# Patient Record
Sex: FEMALE | Race: WHITE
[De-identification: names, ages, dates, MRNs, and addresses within clinical notes are randomized per-mention and may not be internally consistent; named-entity substitution may affect disease eponyms.]

---

## 2017-01-12 ENCOUNTER — HOSPITAL ENCOUNTER (OUTPATIENT)
Dept: HOSPITAL 47 - RADMRIMAIN | Age: 42
Discharge: HOME | End: 2017-01-12
Payer: COMMERCIAL

## 2017-01-12 DIAGNOSIS — M51.26: Primary | ICD-10-CM

## 2017-01-12 DIAGNOSIS — M51.24: ICD-10-CM

## 2017-01-12 PROCEDURE — 72148 MRI LUMBAR SPINE W/O DYE: CPT

## 2017-01-12 PROCEDURE — 72146 MRI CHEST SPINE W/O DYE: CPT

## 2017-01-12 NOTE — MR
EXAMINATION TYPE: MR lumbar wo con

 

DATE OF EXAM: 1/12/2017 8:52 PM

 

COMPARISON: 6/4/2014

 

HISTORY: Mid/lower back pain, tingling lt arm, rt groin/leg pain

 

T1 and T2  axial and sagittal images of the lumbar spine are submitted.  There is no abnormal signal 
seen within the visualized spinal cord or paraspinal soft tissues.

 

T12-L1: No focal disc herniation or significant disc bulge is evident. No spinal canal stenosis or ne
ural foraminal stenosis is present. 

 

L1-L2: No focal disc herniation or significant disc bulge is evident. No spinal canal stenosis or yessica
ral foraminal stenosis is present. 

 

L2-L3: No focal disc herniation or significant disc bulge is evident. No spinal canal stenosis or yessica
ral foraminal stenosis is present. 

 

L3-L4: No focal disc herniation or significant disc bulge is evident. No spinal canal stenosis or yessica
ral foraminal stenosis is present. 

 

L4-L5: Broad-based central disc bulging and hypertrophic change of the facets with mild effacement of
 thecal sac. Mild bilateral foraminal encroachment.

 

L5-S1: No extruded disc fragments. No spinal canal stenosis. 

 

 

IMPRESSION:

1. L4-L5: Broad-based central disc bulging and hypertrophic change of the facets with mild effacement
 of thecal sac. Mild bilateral foraminal encroachment.

 

 

 

 

 

EXAMINATION TYPE: MR thoracic spine wo con

 

DATE OF EXAM: 1/12/2017

 

COMPARISON: 6/4/2014

 

HISTORY: Mid/lower back pain, tingling lt arm, rt groin/leg pain

 

Standard multiplanar, multisequence MRI departmental protocol

 

Multiplanar, multisequence images of the thoracic were acquired. 

 

FINDINGS: 

 

Alignment is anatomic. There is loss of disc signal space at T4-T5, T6-T7, T7-T8, and T8-T9.

 

At T4-T5 there is a left paracentral disc protrusion broad-based with mild effacement of thecal sac b
ut no spinal cord contact. However, there is mass effect upon the left anterior margin of the spinal 
cord secondary to the thecal sac compression. Neural foramina remain patent.

 

At T6-T7 there is broad-based disc bulge or small protrusion with mild effacement of thecal sac but n
o spinal cord contact. Neural foramina appear to be patent.

 

At T7-T8 there is broad-based central disc protrusion. There is mass effect upon the thecal sac. Ther
e does appear to be mild mass effect upon the right anterior spinal cord secondary to the compression
 of the thecal sac. No cord contact.

 

Remaining levels demonstrate no disc herniation, canal stenosis, or foraminal encroachment.

 

No abnormal signal seen within the visualized thoracic spinal cord. Within the lower cervical spinal 
cord is an area of abnormal signal at the C6-C7 level which is stable and compatible with previous re
ported syrinx. Images are not included on the axial images of the thoracic spine

 

 

 

IMPRESSION: 

1. Disc protrusion at T4-T5, T6-T7, and T7-T8 mass effect upon the thecal sac. There is a degree of m
ass effect upon the spinal cord at T4-T5 and T7-T8 as discussed above but no spinal cord contact. Fin
dings at T4-T5 and T7-T8 is  progressed from previous exam.

2. Abnormal signal within the lower cervical spinal cord which is not included on the axial images of
 the thoracic spine. Previous cervical spine MRI demonstrated evidence of a syrinx in this region whi
ch could be followed with a MRI as clinically warranted

## 2017-11-12 ENCOUNTER — HOSPITAL ENCOUNTER (OUTPATIENT)
Dept: HOSPITAL 47 - EC | Age: 42
Setting detail: OBSERVATION
LOS: 1 days | Discharge: HOME | End: 2017-11-13
Payer: COMMERCIAL

## 2017-11-12 VITALS — RESPIRATION RATE: 18 BRPM

## 2017-11-12 VITALS — BODY MASS INDEX: 28.9 KG/M2

## 2017-11-12 DIAGNOSIS — Z87.891: ICD-10-CM

## 2017-11-12 DIAGNOSIS — Z79.52: ICD-10-CM

## 2017-11-12 DIAGNOSIS — J90: ICD-10-CM

## 2017-11-12 DIAGNOSIS — Z88.5: ICD-10-CM

## 2017-11-12 DIAGNOSIS — R79.89: ICD-10-CM

## 2017-11-12 DIAGNOSIS — E78.5: ICD-10-CM

## 2017-11-12 DIAGNOSIS — R07.9: Primary | ICD-10-CM

## 2017-11-12 DIAGNOSIS — Z82.49: ICD-10-CM

## 2017-11-12 DIAGNOSIS — Z86.19: ICD-10-CM

## 2017-11-12 DIAGNOSIS — G89.29: ICD-10-CM

## 2017-11-12 DIAGNOSIS — K51.90: ICD-10-CM

## 2017-11-12 DIAGNOSIS — Z79.891: ICD-10-CM

## 2017-11-12 DIAGNOSIS — R00.1: ICD-10-CM

## 2017-11-12 DIAGNOSIS — Z79.899: ICD-10-CM

## 2017-11-12 DIAGNOSIS — R74.0: ICD-10-CM

## 2017-11-12 DIAGNOSIS — M54.6: ICD-10-CM

## 2017-11-12 LAB
ALP SERPL-CCNC: 40 U/L (ref 38–126)
ALT SERPL-CCNC: 104 U/L (ref 9–52)
ANION GAP SERPL CALC-SCNC: 6 MMOL/L
APTT BLD: 22.5 SEC (ref 22–30)
AST SERPL-CCNC: 32 U/L (ref 14–36)
BASOPHILS # BLD AUTO: 0 K/UL (ref 0–0.2)
BASOPHILS NFR BLD AUTO: 0 %
BUN SERPL-SCNC: 16 MG/DL (ref 7–17)
CALCIUM SPEC-MCNC: 8.9 MG/DL (ref 8.4–10.2)
CH: 29.5
CHCM: 34.3
CHLORIDE SERPL-SCNC: 112 MMOL/L (ref 98–107)
CK SERPL-CCNC: 22 U/L (ref 30–135)
CK SERPL-CCNC: 25 U/L (ref 30–135)
CO2 SERPL-SCNC: 21 MMOL/L (ref 22–30)
EOSINOPHIL # BLD AUTO: 0.1 K/UL (ref 0–0.7)
EOSINOPHIL NFR BLD AUTO: 1 %
ERYTHROCYTE [DISTWIDTH] IN BLOOD BY AUTOMATED COUNT: 4.27 M/UL (ref 3.8–5.4)
ERYTHROCYTE [DISTWIDTH] IN BLOOD: 13.2 % (ref 11.5–15.5)
GLUCOSE SERPL-MCNC: 90 MG/DL (ref 74–99)
HCT VFR BLD AUTO: 37 % (ref 34–46)
HDW: 2.56
HGB BLD-MCNC: 12.8 GM/DL (ref 11.4–16)
INR PPP: 1.2 (ref ?–1.2)
LUC NFR BLD AUTO: 1 %
LYMPHOCYTES # SPEC AUTO: 4.8 K/UL (ref 1–4.8)
LYMPHOCYTES NFR SPEC AUTO: 44 %
MAGNESIUM SPEC-SCNC: 1.8 MG/DL (ref 1.6–2.3)
MCH RBC QN AUTO: 29.9 PG (ref 25–35)
MCHC RBC AUTO-ENTMCNC: 34.5 G/DL (ref 31–37)
MCV RBC AUTO: 86.6 FL (ref 80–100)
MONOCYTES # BLD AUTO: 0.5 K/UL (ref 0–1)
MONOCYTES NFR BLD AUTO: 5 %
NEUTROPHILS # BLD AUTO: 5.5 K/UL (ref 1.3–7.7)
NEUTROPHILS NFR BLD AUTO: 50 %
NON-AFRICAN AMERICAN GFR(MDRD): >60
PH UR: 7.5 [PH] (ref 5–8)
POTASSIUM SERPL-SCNC: 3.5 MMOL/L (ref 3.5–5.1)
PROT SERPL-MCNC: 5.5 G/DL (ref 6.3–8.2)
PT BLD: 12.4 SEC (ref 9–12)
SODIUM SERPL-SCNC: 139 MMOL/L (ref 137–145)
SP GR UR: 1.01 (ref 1–1.03)
TROPONIN I SERPL-MCNC: <0.012 NG/ML (ref 0–0.03)
TROPONIN I SERPL-MCNC: <0.012 NG/ML (ref 0–0.03)
UA BILLING (MACRO VS. MICRO): (no result)
UROBILINOGEN UR QL STRIP: <2 MG/DL (ref ?–2)
WBC # BLD AUTO: 0.1 10*3/UL
WBC # BLD AUTO: 11 K/UL (ref 3.8–10.6)
WBC (PEROX): 10.99

## 2017-11-12 PROCEDURE — 94760 N-INVAS EAR/PLS OXIMETRY 1: CPT

## 2017-11-12 PROCEDURE — 81003 URINALYSIS AUTO W/O SCOPE: CPT

## 2017-11-12 PROCEDURE — 93350 STRESS TTE ONLY: CPT

## 2017-11-12 PROCEDURE — 85379 FIBRIN DEGRADATION QUANT: CPT

## 2017-11-12 PROCEDURE — 93005 ELECTROCARDIOGRAM TRACING: CPT

## 2017-11-12 PROCEDURE — 80048 BASIC METABOLIC PNL TOTAL CA: CPT

## 2017-11-12 PROCEDURE — 84484 ASSAY OF TROPONIN QUANT: CPT

## 2017-11-12 PROCEDURE — 36415 COLL VENOUS BLD VENIPUNCTURE: CPT

## 2017-11-12 PROCEDURE — 84443 ASSAY THYROID STIM HORMONE: CPT

## 2017-11-12 PROCEDURE — 96361 HYDRATE IV INFUSION ADD-ON: CPT

## 2017-11-12 PROCEDURE — 71020: CPT

## 2017-11-12 PROCEDURE — 83880 ASSAY OF NATRIURETIC PEPTIDE: CPT

## 2017-11-12 PROCEDURE — 85025 COMPLETE CBC W/AUTO DIFF WBC: CPT

## 2017-11-12 PROCEDURE — 82550 ASSAY OF CK (CPK): CPT

## 2017-11-12 PROCEDURE — 82553 CREATINE MB FRACTION: CPT

## 2017-11-12 PROCEDURE — 85610 PROTHROMBIN TIME: CPT

## 2017-11-12 PROCEDURE — 93306 TTE W/DOPPLER COMPLETE: CPT

## 2017-11-12 PROCEDURE — 71275 CT ANGIOGRAPHY CHEST: CPT

## 2017-11-12 PROCEDURE — 85730 THROMBOPLASTIN TIME PARTIAL: CPT

## 2017-11-12 PROCEDURE — 84439 ASSAY OF FREE THYROXINE: CPT

## 2017-11-12 PROCEDURE — 80061 LIPID PANEL: CPT

## 2017-11-12 PROCEDURE — 96375 TX/PRO/DX INJ NEW DRUG ADDON: CPT

## 2017-11-12 PROCEDURE — 99285 EMERGENCY DEPT VISIT HI MDM: CPT

## 2017-11-12 PROCEDURE — 93017 CV STRESS TEST TRACING ONLY: CPT

## 2017-11-12 PROCEDURE — 96366 THER/PROPH/DIAG IV INF ADDON: CPT

## 2017-11-12 PROCEDURE — 71010: CPT

## 2017-11-12 PROCEDURE — 83735 ASSAY OF MAGNESIUM: CPT

## 2017-11-12 PROCEDURE — 96365 THER/PROPH/DIAG IV INF INIT: CPT

## 2017-11-12 PROCEDURE — 80053 COMPREHEN METABOLIC PANEL: CPT

## 2017-11-12 PROCEDURE — 96376 TX/PRO/DX INJ SAME DRUG ADON: CPT

## 2017-11-12 RX ADMIN — NITROGLYCERIN SCH: 20 OINTMENT TOPICAL at 18:34

## 2017-11-12 RX ADMIN — NITROGLYCERIN PRN MG: 0.4 TABLET SUBLINGUAL at 14:42

## 2017-11-12 RX ADMIN — MORPHINE SULFATE SCH MG: 30 TABLET, FILM COATED, EXTENDED RELEASE ORAL at 18:32

## 2017-11-12 RX ADMIN — OXYCODONE HYDROCHLORIDE AND ACETAMINOPHEN SCH EACH: 10; 325 TABLET ORAL at 23:06

## 2017-11-12 RX ADMIN — NITROGLYCERIN PRN MG: 0.4 TABLET SUBLINGUAL at 14:48

## 2017-11-12 NOTE — XR
EXAMINATION TYPE: XR chest 2V

 

DATE OF EXAM ORDERED: 11/12/2017

 

HISTORY: difficulty breathing.

 

REFERENCE: Previous study dated 3/30/2011.

 

FINDINGS: 

The lungs are clear. Pleural spaces are clear. Heart size is normal.

 

IMPRESSION:

 

NORMAL CHEST.

## 2017-11-12 NOTE — CT
EXAMINATION TYPE: CT angio chest

 

DATE OF EXAM: 11/12/2017 1:50 PM

 

COMPARISON: There are small, bilateral pleural effusions, greater on the right than the left. There i
s minimal dependent atelectasis within the dependent portions of the lungs.

 

There is no significant axillary, internal mammary, mediastinal or hilar adenopathy.

 

There is no evidence of pulmonary embolus.

 

The aorta is normal in caliber without evidence of dissection.

 

There is no pericardial fluid. The heart is mildly enlarged.

 

Visualized portions of the upper abdomen are normal.

 

There is minimal hypertrophic spondylosis within the spine.

 

HISTORY: 

1. This examination is negative for pulmonary embolus.

2. Small, bilateral pleural effusions, greater on the right than the left.

3. Mild cardiomegaly.

4. Mild degenerative change within the spine.

 

CT DLP: mGycm

Automated exposure control for dose reduction was used.

 

CONTRAST: 

CTA scan of the thorax is performed , patient injected with mL of , pulmonary embolism protocol. .  

 

FINDINGS:

 

LUNGS: The lungs are grossly clear, there is no concerning parenchymal mass or nodule identified.   T
here is no pleural effusion or pneumothorax seen.  The tracheobronchial tree is patent.

 

MEDIASTINUM: There is satisfactory enhancement of the pulmonary artery and its branches, there is no 
CT evidence for pulmonary embolism.  There are no greater than 1 cm hilar or mediastinal lymph nodes.
   No pericardial effusion is seen. 

 

 

OTHER:  No additional significant abnormality is seen.

 

 

 

IMPRESSION:

## 2017-11-12 NOTE — HP
HISTORY AND PHYSICAL



I am covering for Dr. Stallworth.



DATE OF ADMISSION:

11/12/17



CHIEF COMPLAINT:

Chest pain.



HISTORY:

This 41-year-old woman with a past medical history of multiple medical problems

including chronic back pain, history of C diff, history of breast surgery,

tonsillectomy, being followed by Dr. Stallworth in the outpatient setting 
complaining of

chest pain.  The patient had a heavy feeling of the chest, pressure type of 
pain with

like somebody sitting on the chest on the anterior part of the chest  and the 
pain

radiated to the left shoulder. Initially it was sharp in character, but 
subsequently

patient had shortness of breath and because of the continued symptoms, patient 
came to

[facility] and was admitted for further evaluation and treatment.  The 
initial

troponin was found to be negative and the EKG done showed sinus bradycardia 
with some

ST-T changes. A D-dimer was found to be 1.41.  The patient also underwent a CT

angiography which showed no pulmonary embolism and small bilateral pleural 
effusions

also noted.  Mild DJD of the spine is also noted.  There is no history of fever,

rigors.  No history of headache, loss of consciousness or seizures.



PAST MEDICAL HISTORY:

History of chronic back pain, history of breast surgery.



MEDICATIONS:

Prior to admission include:

1. Hydrocodone 10 mg b.i.d.

2. Zonegran 200 mg q.h.s.

3. Zocor 20 mg q.h.s.

4. MiraLAX 17 g daily p.r.n.

5. MS Contin 30 mg p.o. b.i.d.

6. Nexium 40 mg p.o. daily.



ALLERGIES:

IODINATED CONTRAST DYE.



FAMILY HISTORY:

History of diabetes and heart disease in the family.



SOCIAL HISTORY:

Previous history of smoking.  No history of alcohol intake.



REVIEW OF SYSTEMS:

ENT: No diminished vision, no diminished hearing.  Cardiovascular as mentioned 
earlier.

Respirations as mentioned earlier.  GI no nausea or vomiting.   no dysuria or

retention.  Nervous system: No numbness or weakness.  Allergy/Immunology: No 
asthma or

hayfever.  Musculoskeletal;  as mentioned earlier.  Hematology/Oncology:  No 
history of

anemia.  Endocrine no history of diabetes or hypothyroidism.  Constitutional: As

mentioned earlier.  Dermatology: Negative.  Rheumatology: Negative.  Psychiatric
: As

mentioned earlier.



PHYSICAL EXAM:

Patient is alert, oriented x3.  The pulse is 42, blood pressure 109/61, 
respiratory

rate 14, temperature 98 degrees, pulse ox 98% room air. HEENT: Conjunctivae 
normal.

Oral mucosa moist.  Neck is no jugular venous distention.  No carotid bruit. No 
lymph

node enlargement.

Cardiovascular system:  S1, S2 muffled. No S3, no S4.  Respiratory: Breath 
sounds

diminished in the bases.  No rhonchi.  No crackles.

ABDOMEN:  Soft, nontender.  No mass palpable.  Legs no edema and no swelling.

NERVOUS SYSTEM: Higher functions as mentioned. Moves all 4 limbs.  No focal 
motor or

sensory deficits. Lymphatics: No lymph nodes palpable in the neck, axillae or 
groin.

Skin no ulcer, rash or bleeding.



LABS:

WBC 11, hemoglobin 12.8, INR is 1.2, and CO2 is 21 and ALT is 104 and AST is 32 



ASSESSMENT:

1. Chest pain possible unstable angina.

2. Sinus bradycardia.

3. Increased WBC.

4. Increased ALT.

5. History of back pain.

6. History of Clostridium difficile colitis.

7. Small bilateral pleural effusion on the CTA.



RECOMMENDATIONS AND DISCUSSION:

In this 41-year-old woman who presented with multiple complex medical issues, 
we will

monitor the patient closely, continue the current management and symptomatic 
treatment.

Otherwise at this time, I recommend continue the current medications. Rule out

myocardial infarction.  Unstable angina protocol.  Cardiology consultation.  
Dr. Stallworth

will follow.  Further followup regarding the above-mentioned medical issues as

outpatient.  Discussed with the patient who understands and agrees.





MMODL / IJN: [id number] / Job#: [id number]

MTDD

## 2017-11-12 NOTE — ED
SOB HPI





- General


Chief Complaint: Shortness of Breath


Stated Complaint: malcolm following IV infusion x 3 days


Time Seen by Provider: 11/12/17 11:23


Source: patient, family, RN notes reviewed


Mode of arrival: ambulatory


Limitations: no limitations





- History of Present Illness


Initial Comments: 





This is a 41-year-old female presents with complaints of chest heaviness and 

shortness of breath.  She states she's received 3 IV injections of Solu-Medrol 

her neurologist is using apparently try to rule out MS.  She states she started 

developing left-sided chest pain sharp and heaviness with some radiation to the 

left shoulder it feels like it is sharp initially gets worse with deep 

breathing attempts.  It feels like someone sitting on her chest she states.  

She has a cough fevers chills nausea vomiting sweats or other symptoms.  

Patient has no prior history of heart or lung disease she is a ex-smoker.  

Patient did state that the pain as well as 6/10 at this time.


MD Complaint: shortness of breath, chest pain





- Related Data


 Home Medications











 Medication  Instructions  Recorded  Confirmed


 


Esomeprazole Magnesium [NexIUM] 40 mg PO DAILY 11/12/17 11/12/17


 


Morphine Sulfate ER [Ms Contin 30 mg PO Q12HR 11/12/17 11/12/17





30Mg]   


 


Polyethylene Glycol 3350 [Miralax] 17 gm PO DAILY PRN 11/12/17 11/12/17


 


Simvastatin [Zocor] 20 mg PO HS 11/12/17 11/12/17


 


Zonisamide [Zonegran] 200 mg PO HS 11/12/17 11/12/17


 


oxyCODONE-APAP 10-325MG [Percocet 1 tab PO BID 11/12/17 11/12/17





 mg]   











 Allergies











Allergy/AdvReac Type Severity Reaction Status Date / Time


 


Iodinated Contrast- Oral and Allergy  Itching Verified 11/12/17 11:47





IV Dye     














Review of Systems


ROS Statement: 


Those systems with pertinent positive or pertinent negative responses have been 

documented in the HPI.





ROS Other: All systems not noted in ROS Statement are negative.





Past Medical History


Past Medical History: No Reported History


History of Any Multi-Drug Resistant Organisms: C-DIFF


Date of last positivie culture/infection: 2015


Past Surgical History: Breast Surgery, Tonsillectomy


Past Psychological History: No Psychological Hx Reported


Smoking Status: Former smoker


Past Alcohol Use History: None Reported


Past Drug Use History: None Reported





General Exam





- General Exam Comments


Initial Comments: 





This is a well-developed well-nourished awake alert oriented 3 female


Limitations: no limitations


General appearance: alert, anxious


Head exam: Present: atraumatic, normocephalic, normal inspection


Eye exam: Present: normal appearance, PERRL, EOMI.  Absent: scleral icterus, 

conjunctival injection, periorbital swelling


ENT exam: Present: normal exam, mucous membranes moist


Neck exam: Present: normal inspection.  Absent: tenderness, meningismus, 

lymphadenopathy


Respiratory exam: Present: normal lung sounds bilaterally.  Absent: respiratory 

distress, wheezes, rales, rhonchi, stridor


Cardiovascular Exam: Present: regular rate, normal rhythm, normal heart sounds.

  Absent: systolic murmur, diastolic murmur, rubs, gallop, clicks


GI/Abdominal exam: Present: soft, normal bowel sounds.  Absent: distended, 

tenderness, guarding, rebound, rigid


Extremities exam: Present: normal inspection, full ROM, normal capillary 

refill.  Absent: tenderness, pedal edema, joint swelling, calf tenderness


Back exam: Present: normal inspection


Neurological exam: Present: alert, oriented X3, CN II-XII intact


Psychiatric exam: Present: normal affect, normal mood


Skin exam: Present: warm, dry, intact, normal color.  Absent: rash





Course


 Vital Signs











  11/12/17 11/12/17 11/12/17





  11:15 12:25 12:26


 


Temperature 97.7 F  


 


Pulse Rate 50 L 42 L 


 


Respiratory 18 16 16





Rate   


 


Blood Pressure 124/57  


 


O2 Sat by Pulse 100 100 





Oximetry   














  11/12/17 11/12/17 11/12/17





  12:52 13:55 14:43


 


Temperature   


 


Pulse Rate 51 L 40 L 50 L


 


Respiratory 16 18 





Rate   


 


Blood Pressure 133/69 147/71 146/67


 


O2 Sat by Pulse 100 100 





Oximetry   














  11/12/17 11/12/17





  14:48 15:08


 


Temperature  


 


Pulse Rate 55 L 50 L


 


Respiratory 18 18





Rate  


 


Blood Pressure 119/62 105/58


 


O2 Sat by Pulse 99 97





Oximetry  














Medical Decision Making





- Medical Decision Making





Patient persisted having retrosternal chest pressure.  Nitroglycerin did seem 

to help.  Patient has symptoms consistent with angina.  She will be admitted 

for cardiology consultation.  The case will be discussed with Dr. Martínez who is 

covering Dr. Stallworth.





- Lab Data


Result diagrams: 


 11/12/17 12:00





 11/12/17 12:00


 Lab Results











  11/12/17 11/12/17 11/12/17 Range/Units





  12:00 12:00 12:00 


 


WBC   11.0 H   (3.8-10.6)  k/uL


 


RBC   4.27   (3.80-5.40)  m/uL


 


Hgb   12.8   (11.4-16.0)  gm/dL


 


Hct   37.0   (34.0-46.0)  %


 


MCV   86.6   (80.0-100.0)  fL


 


MCH   29.9   (25.0-35.0)  pg


 


MCHC   34.5   (31.0-37.0)  g/dL


 


RDW   13.2   (11.5-15.5)  %


 


Plt Count   204   (150-450)  k/uL


 


Neutrophils %   50   %


 


Lymphocytes %   44   %


 


Monocytes %   5   %


 


Eosinophils %   1   %


 


Basophils %   0   %


 


Neutrophils #   5.5   (1.3-7.7)  k/uL


 


Lymphocytes #   4.8   (1.0-4.8)  k/uL


 


Monocytes #   0.5   (0-1.0)  k/uL


 


Eosinophils #   0.1   (0-0.7)  k/uL


 


Basophils #   0.0   (0-0.2)  k/uL


 


PT     (9.0-12.0)  sec


 


INR     (<1.2)  


 


APTT     (22.0-30.0)  sec


 


D-Dimer     (<0.60)  mg/L FEU


 


Sodium    139  (137-145)  mmol/L


 


Potassium    3.5  (3.5-5.1)  mmol/L


 


Chloride    112 H  ()  mmol/L


 


Carbon Dioxide    21 L  (22-30)  mmol/L


 


Anion Gap    6  mmol/L


 


BUN    16  (7-17)  mg/dL


 


Creatinine    0.69  (0.52-1.04)  mg/dL


 


Est GFR (MDRD) Af Amer    >60  (>60 ml/min/1.73 sqM)  


 


Est GFR (MDRD) Non-Af    >60  (>60 ml/min/1.73 sqM)  


 


Glucose    90  (74-99)  mg/dL


 


Calcium    8.9  (8.4-10.2)  mg/dL


 


Magnesium    1.8  (1.6-2.3)  mg/dL


 


Total Bilirubin    0.3  (0.2-1.3)  mg/dL


 


AST    32  (14-36)  U/L


 


ALT    104 H  (9-52)  U/L


 


Alkaline Phosphatase    40  ()  U/L


 


Total Creatine Kinase  25 L    ()  U/L


 


CK-MB (CK-2)  0.3    (0.0-2.4)  ng/mL


 


CK-MB (CK-2) Rel Index  1.2    


 


Troponin I  <0.012    (0.000-0.034)  ng/mL


 


NT-Pro-B Natriuret Pep     pg/mL


 


Total Protein    5.5 L  (6.3-8.2)  g/dL


 


Albumin    3.0 L  (3.5-5.0)  g/dL














  11/12/17 11/12/17 Range/Units





  12:00 12:00 


 


WBC    (3.8-10.6)  k/uL


 


RBC    (3.80-5.40)  m/uL


 


Hgb    (11.4-16.0)  gm/dL


 


Hct    (34.0-46.0)  %


 


MCV    (80.0-100.0)  fL


 


MCH    (25.0-35.0)  pg


 


MCHC    (31.0-37.0)  g/dL


 


RDW    (11.5-15.5)  %


 


Plt Count    (150-450)  k/uL


 


Neutrophils %    %


 


Lymphocytes %    %


 


Monocytes %    %


 


Eosinophils %    %


 


Basophils %    %


 


Neutrophils #    (1.3-7.7)  k/uL


 


Lymphocytes #    (1.0-4.8)  k/uL


 


Monocytes #    (0-1.0)  k/uL


 


Eosinophils #    (0-0.7)  k/uL


 


Basophils #    (0-0.2)  k/uL


 


PT  12.4 H   (9.0-12.0)  sec


 


INR  1.2 H   (<1.2)  


 


APTT  22.5   (22.0-30.0)  sec


 


D-Dimer  1.41 H   (<0.60)  mg/L FEU


 


Sodium    (137-145)  mmol/L


 


Potassium    (3.5-5.1)  mmol/L


 


Chloride    ()  mmol/L


 


Carbon Dioxide    (22-30)  mmol/L


 


Anion Gap    mmol/L


 


BUN    (7-17)  mg/dL


 


Creatinine    (0.52-1.04)  mg/dL


 


Est GFR (MDRD) Af Amer    (>60 ml/min/1.73 sqM)  


 


Est GFR (MDRD) Non-Af    (>60 ml/min/1.73 sqM)  


 


Glucose    (74-99)  mg/dL


 


Calcium    (8.4-10.2)  mg/dL


 


Magnesium    (1.6-2.3)  mg/dL


 


Total Bilirubin    (0.2-1.3)  mg/dL


 


AST    (14-36)  U/L


 


ALT    (9-52)  U/L


 


Alkaline Phosphatase    ()  U/L


 


Total Creatine Kinase    ()  U/L


 


CK-MB (CK-2)    (0.0-2.4)  ng/mL


 


CK-MB (CK-2) Rel Index    


 


Troponin I    (0.000-0.034)  ng/mL


 


NT-Pro-B Natriuret Pep   546  pg/mL


 


Total Protein    (6.3-8.2)  g/dL


 


Albumin    (3.5-5.0)  g/dL














- EKG Data


-: EKG Interpreted by Me


EKG shows normal: sinus rhythm (Sinus bradycardia rate of 40.  Ago 122 QRS 90 

QT since QTC of 456/371 no acute ST-T wave changes seen)





- Radiology Data


Radiology results: report reviewed (I did review the imaging and reports no 

acute findings no evidence of PE.), image reviewed





Disposition


Clinical Impression: 


 Unstable angina, Chest pain





Disposition: ADMITTED AS IP TO THIS [facility]


Condition: Stable


Referrals: 


Lester Stallworth DO [Primary Care Provider] - 1-2 days

## 2017-11-13 VITALS — SYSTOLIC BLOOD PRESSURE: 119 MMHG | TEMPERATURE: 98.2 F | DIASTOLIC BLOOD PRESSURE: 57 MMHG | HEART RATE: 58 BPM

## 2017-11-13 LAB
ANION GAP SERPL CALC-SCNC: 5 MMOL/L
BASOPHILS # BLD AUTO: 0 K/UL (ref 0–0.2)
BASOPHILS NFR BLD AUTO: 0 %
BUN SERPL-SCNC: 13 MG/DL (ref 7–17)
CALCIUM SPEC-MCNC: 8.7 MG/DL (ref 8.4–10.2)
CH: 29.3
CHCM: 33.6
CHLORIDE SERPL-SCNC: 110 MMOL/L (ref 98–107)
CHOLEST SERPL-MCNC: 136 MG/DL (ref ?–200)
CK SERPL-CCNC: 22 U/L (ref 30–135)
CO2 SERPL-SCNC: 24 MMOL/L (ref 22–30)
EOSINOPHIL # BLD AUTO: 0 K/UL (ref 0–0.7)
EOSINOPHIL NFR BLD AUTO: 0 %
ERYTHROCYTE [DISTWIDTH] IN BLOOD BY AUTOMATED COUNT: 4.04 M/UL (ref 3.8–5.4)
ERYTHROCYTE [DISTWIDTH] IN BLOOD: 13.2 % (ref 11.5–15.5)
GLUCOSE SERPL-MCNC: 100 MG/DL (ref 74–99)
HCT VFR BLD AUTO: 35.4 % (ref 34–46)
HDLC SERPL-MCNC: 55 MG/DL (ref 40–60)
HDW: 2.54
HGB BLD-MCNC: 11.7 GM/DL (ref 11.4–16)
LUC NFR BLD AUTO: 1 %
LYMPHOCYTES # SPEC AUTO: 2.9 K/UL (ref 1–4.8)
LYMPHOCYTES NFR SPEC AUTO: 22 %
MCH RBC QN AUTO: 29 PG (ref 25–35)
MCHC RBC AUTO-ENTMCNC: 33.1 G/DL (ref 31–37)
MCV RBC AUTO: 87.6 FL (ref 80–100)
MONOCYTES # BLD AUTO: 0.9 K/UL (ref 0–1)
MONOCYTES NFR BLD AUTO: 7 %
NEUTROPHILS # BLD AUTO: 9.3 K/UL (ref 1.3–7.7)
NEUTROPHILS NFR BLD AUTO: 70 %
NON-AFRICAN AMERICAN GFR(MDRD): >60
POTASSIUM SERPL-SCNC: 3.7 MMOL/L (ref 3.5–5.1)
SODIUM SERPL-SCNC: 139 MMOL/L (ref 137–145)
TROPONIN I SERPL-MCNC: <0.012 NG/ML (ref 0–0.03)
WBC # BLD AUTO: 0.11 10*3/UL
WBC # BLD AUTO: 13.2 K/UL (ref 3.8–10.6)
WBC (PEROX): 13.57

## 2017-11-13 RX ADMIN — OXYCODONE HYDROCHLORIDE AND ACETAMINOPHEN SCH EACH: 10; 325 TABLET ORAL at 11:24

## 2017-11-13 RX ADMIN — NITROGLYCERIN SCH: 20 OINTMENT TOPICAL at 04:06

## 2017-11-13 RX ADMIN — MORPHINE SULFATE SCH MG: 30 TABLET, FILM COATED, EXTENDED RELEASE ORAL at 05:03

## 2017-11-13 RX ADMIN — NITROGLYCERIN SCH INCH: 20 OINTMENT TOPICAL at 05:18

## 2017-11-13 NOTE — P.CRDCN
History of Present Illness


Consult date: 17


History of present illness: 





This is a 41-year-old  female patient past medical history significant 

for high cholesterol.  She also suffers from chronic thoracic back pain and is 

currently being evaluated by a neurologist to rule out MS. She recently 

underwent a three-day infusion of steroids Wednesday, Thursday and Friday of 

last week.  After the third day she developed heavy pressure type chest pain on 

the left side of the chest that radiated into the left shoulder, arm and flank/

thoracic area.  She says this is associated with dizziness and shortness of 

breath.  The shortness of breath is worse when she lays flat and she has been 

requiring sitting up on multiple pillows. The pain is worse when she lays flat 

as well. If she sits up the pain almost entirely goes away except for in the 

flank region. At the time of my exam she is seen sitting up in bed in no acute 

distress. She denies symptoms of chest pain, shortness of breath, dizziness, 

palpitations, nausea or vomiting. 


EKG reveals sinus bradycardia with nonspecific flattened T-wave abnormalities 

in inferior leads with a Q-wave. There is no old for comparison. 


Cardiac enzymes negative x3.  D-dimer 1.41 with negative CTA for PE. 


Current cardiac medications include simvastatin 20 mg daily. 


 





Review of Systems





CONSTITUTIONAL: Denies fever. Denies chills.


EYES: Denies blurred vision. Denies vision changes. Denies eye pain.


EARS, NOSE, MOUTH & THROAT: Denies headache. Denies sore throat. Denies ear 

pain.


CARDIOVASCULAR: Complains of one episode of chest pain with shortness of breath 

and dizziness, resolved. Denies orthopnea. Denies PND. Denies palpitations.


RESPIRATORY: Denies cough. 


GASTROINTESTINAL: Denies abdominal pain. Denies diarrhea. Denies constipation. 

Denies nausea. Denies vomiting.


MUSCULOSKELETAL: Denies myalgias.


INTEGUMENTARY: Denies pruitis. Denies rash.


NEUROLOGIC: Denies numbness. Denies tingling. Denies weakness.


PSYCHIATRIC: Denies anxiety. Denies depression.


ENDOCRINE: Denies fatigue. Denies weight change. Denies polydipsia. Denies 

polyurina.


GENITOURINARY: Denies burning, hematuria or urgency with micturation.


HEMATOLOGIC: Denies history of anemia. Denies bleeding. 








Past Medical History


Past Medical History: No Reported History


Additional Past Medical History / Comment(s): chronic back pain- pt recieving 

infusions and rule out MS.


History of Any Multi-Drug Resistant Organisms: C-DIFF


Date of last positivie culture/infection: 


MDRO Source:: stool


Past Surgical History: Breast Surgery, Tonsillectomy


Additional Past Surgical History / Comment(s): lumpectomy


Past Anesthesia/Blood Transfusion Reactions: No Reported Reaction


Past Psychological History: No Psychological Hx Reported


Smoking Status: Former smoker


Past Alcohol Use History: None Reported


Additional Past Alcohol Use History / Comment(s): stopped smoking in . pt 

states she was smoking half a pack to a pack a day.


Past Drug Use History: None Reported





- Past Family History


  ** Father


Family Medical History: Diabetes Mellitus


Additional Family Medical History / Comment(s): heart disease.  at 22 from 

car accident.





  ** Mother


Family Medical History: No Reported History





Medications and Allergies


 Home Medications











 Medication  Instructions  Recorded  Confirmed  Type


 


Esomeprazole Magnesium [NexIUM] 40 mg PO DAILY 17 History


 


Morphine Sulfate ER [Ms Contin 30 mg PO Q12HR 17 History





30Mg]    


 


Polyethylene Glycol 3350 [Miralax] 17 gm PO DAILY PRN 17 History


 


Simvastatin [Zocor] 20 mg PO HS 17 History


 


Zonisamide [Zonegran] 200 mg PO HS 17 History


 


oxyCODONE-APAP 10-325MG [Percocet 1 tab PO BID 17 History





 mg]    











 Allergies











Allergy/AdvReac Type Severity Reaction Status Date / Time


 


Iodinated Contrast- Oral and Allergy  Itching Verified 17 11:47





IV Dye     














Physical Exam


Vitals: 


 Vital Signs











  Temp Pulse Pulse Resp BP BP BP


 


 17 07:45  97.5 F L   56 L  18    140/68


 


 17 04:00  97.9 F   40 L  18   100/56 


 


 17 00:00    40 L  18   


 


 17 23:42  97.9 F   49 L  18   83/47 


 


 17 20:00    52 L  18   


 


 17 19:55  97.9 F   52 L  18   105/55 


 


 17 17:05    42 L  14   


 


 17 16:35  98.0 F   42 L  14   109/61 


 


 17 16:11  98 F  52 L   20  121/58  


 


 17 15:08   50 L   18  105/58  


 


 17 14:48   55 L   18  119/62  


 


 17 14:43   50 L    146/67  


 


 17 13:55   40 L   18  147/71  


 


 17 12:52   51 L   16  133/69  


 


 17 12:26     16   


 


 17 12:25   42 L   16   


 


 17 11:15  97.7 F  50 L   18  124/57  














  Pulse Ox


 


 17 07:45  100


 


 17 04:00  98


 


 17 00:00 


 


 17 23:42  98


 


 17 20:00 


 


 17 19:55  97


 


 17 17:05 


 


 17 16:35  99


 


 17 16:11  97


 


 17 15:08  97


 


 17 14:48  99


 


 17 14:43 


 


 17 13:55  100


 


 17 12:52  100


 


 17 12:26 


 


 17 12:25  100


 


 17 11:15  100








 Intake and Output











 17





 22:59 06:59 14:59


 


Intake Total  1080 


 


Balance  1080 


 


Intake:   


 


  Intake, IV Titration  480 





  Amount   


 


    Heparin Sodium,Porcine/  240 





    D5w Pmx 25,000 unit In   





    Dextrose/Water 1 500ml.   





    bag @ 12 UNITS/KG/HR 17.   





    96 mls/hr IV .Q24H JUDY Rx   





    #:[id number]   


 


    Sodium Chloride 0.9% 1,  240 





    000 ml @ 20 mls/hr IV .   





    Q24H JUDY Rx#:[id number]   


 


  Oral  600 


 


Other:   


 


  Voiding Method Toilet Toilet 


 


  # Voids  2 


 


  Weight 76.4 kg  














GENERAL: This is a 41-year-old  female in no apparent distress at the 

time of my examination.


HEENT: Head is atraumatic, normocephalic. Pupils are equal, round. Sclerae 

anicteric. Conjunctivae are clear. Mucous membranes of the mouth are moist. 

Neck is supple. There is no jugular venous distention. No carotid bruit is 

heard.


LUNGS: Clear to auscultation no wheezes, rales or rhonchi. No chest wall 

tenderness is noted on palpation or with deep breathing.


HEART: Regular rate and rhythm without murmurs, rubs or gallops. S1 and S2 

heard.


ABDOMEN: Soft, nontender. Bowel sounds are heard. No organomegaly noted.


EXTREMITIES: 2+ peripheral pulses with no evidence of peripheral edema and no 

calf tenderness noted.


NEUROLOGIC: Patient is awake, alert and oriented x3.


 








Results





 17 05:28





 17 05:28


 Cardiac Enzymes











  17 Range/Units





  12:00 12:00 18:24 


 


AST   32   (14-36)  U/L


 


CK-MB (CK-2)  0.3   0.2  (0.0-2.4)  ng/mL


 


Troponin I  <0.012   <0.012  (0.000-0.034)  ng/mL














  17 Range/Units





  00:44 


 


AST   (14-36)  U/L


 


CK-MB (CK-2)  <0.2  (0.0-2.4)  ng/mL


 


Troponin I  <0.012  (0.000-0.034)  ng/mL








 Coagulation











  17 Range/Units





  12:00 00:44 


 


PT  12.4 H   (9.0-12.0)  sec


 


APTT  22.5  51.7 H  (22.0-30.0)  sec








 Lipids











  17 Range/Units





  05:28 


 


Triglycerides  66  (<150)  mg/dL


 


Cholesterol  136  (<200)  mg/dL


 


HDL Cholesterol  55  (40-60)  mg/dL








 CBC











  17 Range/Units





  12:00 05:28 


 


WBC  11.0 H  13.2 H  (3.8-10.6)  k/uL


 


RBC  4.27  4.04  (3.80-5.40)  m/uL


 


Hgb  12.8  11.7  (11.4-16.0)  gm/dL


 


Hct  37.0  35.4  (34.0-46.0)  %


 


Plt Count  204  219  (150-450)  k/uL








 Comprehensive Metabolic Panel











  17 Range/Units





  12:00 05:28 


 


Sodium  139  139  (137-145)  mmol/L


 


Potassium  3.5  3.7  (3.5-5.1)  mmol/L


 


Chloride  112 H  110 H  ()  mmol/L


 


Carbon Dioxide  21 L  24  (22-30)  mmol/L


 


BUN  16  13  (7-17)  mg/dL


 


Creatinine  0.69  0.60  (0.52-1.04)  mg/dL


 


Glucose  90  100 H  (74-99)  mg/dL


 


Calcium  8.9  8.7  (8.4-10.2)  mg/dL


 


AST  32   (14-36)  U/L


 


ALT  104 H   (9-52)  U/L


 


Alkaline Phosphatase  40   ()  U/L


 


Total Protein  5.5 L   (6.3-8.2)  g/dL


 


Albumin  3.0 L   (3.5-5.0)  g/dL








 Current Medications











Generic Name Dose Route Start Last Admin





  Trade Name Freq  PRN Reason Stop Dose Admin


 


Aspirin  325 mg  17 09:00  





  Aspirin  PO   





  DAILY [facility]   


 


Atorvastatin Calcium  10 mg  17 21:00  17 20:51





  Lipitor  PO   10 mg





  [facility] JUDY   Administration


 


Sodium Chloride  1,000 mls @ 20 mls/hr  17 16:00  17 16:06





  Saline 0.9%  IV   20 mls/hr





  .Q24H JUDY   Administration


 


Heparin Sodium/Dextrose 25,000  500 mls @ 17.96 mls/hr  17 16:00   16:07





  unit/ IV Solution  IV   12 units/kg/hr





  .Q24H JUDY   17.96 mls/hr





  Protocol   Administration





  12 UNITS/KG/HR   


 


Miscellaneous Information  1 each  17 13:09  





  Rx Info: Iv Contrast Was Given  MISCELLANE  17 13:09  





  DAILY PRN   





  Per Protocol   


 


Morphine Sulfate  30 mg  17 19:00  17 05:03





  Ms Contin  PO   30 mg





  0500,1700 JUDY   Administration


 


Nitroglycerin  0.4 mg  17 14:33  17 14:48





  Nitrostat  SUBLINGUAL   0.4 mg





  Q5M PRN   Administration





  Chest Pain   


 


Nitroglycerin  1 inch  17 18:00  17 05:18





  Nitro-Bid Oint  TOPICAL   1 inch





  Q6HR JUDY   Administration


 


Oxycodone/Acetaminophen  1 each  17 19:00  17 23:06





  Percocet   PO   1 each





  0500,1700 JUDY   Administration


 


Pantoprazole Sodium  40 mg  17 07:30  





  Protonix  PO   





  AC-BRKFST JUDY   


 


Polyethylene Glycol  17 gm  17 15:54  





  Miralax  PO   





  DAILY PRN   





  Constipation   


 


Zonisamide  200 mg  17 21:00  17 20:51





  Zonegran  PO   200 mg





  HS JUDY   Administration








 Intake and Output











 17





 22:59 06:59 14:59


 


Intake Total  1080 


 


Balance  1080 


 


Intake:   


 


  Intake, IV Titration  480 





  Amount   


 


    Heparin Sodium,Porcine/  240 





    D5w Pmx 25,000 unit In   





    Dextrose/Water 1 500ml.   





    bag @ 12 UNITS/KG/HR 17.   





    96 mls/hr IV .Q24H JUDY Rx   





    #:[id number]   


 


    Sodium Chloride 0.9% 1,  240 





    000 ml @ 20 mls/hr IV .   





    Q24H JUDY Rx#:[id number]   


 


  Oral  600 


 


Other:   


 


  Voiding Method Toilet Toilet 


 


  # Voids  2 


 


  Weight 76.4 kg  








 





 17 05:28 





 17 05:28 











Assessment and Plan


Assessment: 





ASSESSMENT


1. Chest pain, atypical for acute coronary syndrome with risk factor of 

hyperlipidemia. 


2. Hyperlipidemia





PLAN


Obtain 2D echocardiogram and doppler study to evaluate cardiac structure and 

function.


Perform stress echocardiogram to evaluate for ischemia. 


If this diagnostic testing is negative, from a cardiac perspective, she is 

stable for discharge home to follow up with PCP. 


Thank you kindly for this consultation. 





Nurse Practitioner note has been reviewed, I agree with a documented findings 

and plan of care.  Patient was seen and examined.

## 2017-11-13 NOTE — ECHOF
Referral Reason:chest pain



MEASUREMENTS

--------

HEIGHT: 162.6 cm

WEIGHT: 76.2 kg

BP: 100/56

RVIDd:   3.0 cm     (< 3.3)

IVSd:   1.0 cm     (0.6 - 1.1)

LVIDd:   4.1 cm     (3.9 - 5.3)

LVPWd:   0.9 cm     (0.6 - 1.1)

IVSs:   1.2 cm

LVIDs:   2.9 cm

LVPWs:   1.4 cm

LA Diam:   3.2 cm     (2.7 - 3.8)

LAESV Index (A-L):   24.96 ml/m

Ao Diam:   3.0 cm     (2.0 - 3.7)

AV Cusp:   2.2 cm     (1.5 - 2.6)

MV EXCURSION:   17.484 mm     (> 18.000)

MV EF SLOPE:   212 mm/s     (70 - 150)

EPSS:   0.2 cm

MV E Obed:   1.27 m/s

MV DecT:   177 ms

MV A Obed:   0.74 m/s

MV E/A Ratio:   1.72 

RAP:   5.00 mmHg

RVSP:   23.28 mmHg







FINDINGS

--------

Resting bradycardia (HR<60bpm).

This was a technically good study.

The left ventricular size is normal.   Left ventricular wall thickness is normal.   Overall left vent
ricular systolic function is normal with, an EF between 60 - 65 %.

The right ventricle is normal in size.

Normal LA  size by volume 22+/-6 ml/m2.

The right atrium is normal in size.

The aortic valve is trileaflet and appears structurally normal.

Mild mitral annular calcification present.   There is trace mitral regurgitation.

Mild tricuspid regurgitation present.   Right ventricular systolic pressure is normal at < 35 mmHg.

Trace/mild (physiologic)  pulmonic regurgitation.

The aortic root size is normal.

The inferior vena cava is dilated with poor inspiratory collapse which is consistent with estimated r
ight atrial pressure of 15 mmHg.

There is no pericardial effusion.



CONCLUSIONS

--------

1. Resting bradycardia (HR<60bpm).

2. This was a technically good study.

3. The left ventricular size is normal.

4. Left ventricular wall thickness is normal.

5. Overall left ventricular systolic function is normal with, an EF between 60 - 65 %.

6. The right ventricle is normal in size.

7. Normal LA size by volume 22+/-6 ml/m2.

8. The right atrium is normal in size.

9. The aortic valve is trileaflet and appears structurally normal.

10. Mild mitral annular calcification present.

11. There is trace mitral regurgitation.

12. Mild tricuspid regurgitation present.

13. Right ventricular systolic pressure is normal at < 35 mmHg.

14. Trace/mild (physiologic)  pulmonic regurgitation.

15. The aortic root size is normal.

16. The inferior vena cava is dilated with poor inspiratory collapse which is consistent with estimat
ed right atrial pressure of 15 mmHg.

17. There is no pericardial effusion.





SONOGRAPHER: Caridad Valencia RDCS

## 2017-11-13 NOTE — P.DS
Providers


Date of admission: 


11/12/17 15:52





Expected date of discharge: 11/13/17


Attending physician: 


Lester Stallworth





Consults: 





 





11/12/17 15:52


Consult Physician Urgent 


   Consulting Provider: Gus Nails


   Consult Reason/Comments: Chest pain


   Do you want consulting provider notified?: Yes











Primary care physician: 


Lester [facility] Course: 





41 year old  female who presented to the emergency room on 11/12/2017 

with a chief complaint of chest pain.  The patient's EKG completed in the 

emergency room showed sinus bradycardia with a rate in the 40s.  Her d-dimer 

was elevated at 1.41.  She underwent a CT of the chest which was negative for 

pulmonary embolism but did show small bilateral pleural effusions.





The patient was seen and examined at the bedside on rounds with Dr. Stallworth. She 

states she has been undergoing workup for possible MS and has had high-dose 

steroids for the last 3 days per her neurologist.  She started having chest 

pain that she describes as "squeezing" on the left side of her chest that wraps 

around to the left side of her back.  The pain is not reproducible upon 

palpation.  She states there is nothing that makes it better or worse.  She 

states she is unable to lay down because the pain intensifies when she is 

supine.  She states that she feels like she is unable to take a deep breath.  

She remains on 2 L nasal cannula and is maintaining oxygen saturation greater 

than 92%.  The patient remains bradycardic with a rate ranging from 38-56 

overnight.  Troponins were negative 3.  Lipid panel reveals triglycerides of 66

, cholesterol 136, LDL 68, and HDL 55.  





An echo was completed which revealed EF 60-65%, trace mitral regurgitation, 

mild tricuspid regurgitation. She underwent stress testing today which was 

found to be negative per cardiology NP, Poonam Jacobson.





The patient is stable for DC and may follow up with Dr. Stallworth outpatient. No 

need for cardiology follow up per SHARRON Levin








DISCHARGE DIAGNOSIS





Chest pain, present on admission, troponins negative 3, stress testing negative

, Echo shows EF 60-65%, trace mitral regurgitation, mild tricuspid regurgitation





Elevated d-dimer, CT chest negative for pulmonary emboli but noted to have 

small bilateral pleural effusions





Sinus bradycardia, asymptomatic, improving





Chronic back pain with recent high-dose steroid infusions for pain control and 

possible multiple sclerosis





History of ulcerative colitis





History of Clostridium difficile





History of tobacco abuse, in remission, patient quit smoking cigarettes in 2011








Nurse practitioner note has been reviewed by physician. Signing provider agrees 

with the documented findings, assessment, and plan of care. 





Patient Condition at Discharge: Stable





Plan - Discharge Summary


New Discharge Prescriptions: 


Continue


   oxyCODONE-APAP 10-325MG [Percocet  mg] 1 tab PO BID


   Zonisamide [Zonegran] 200 mg PO HS


   Simvastatin [Zocor] 20 mg PO HS


   Morphine Sulfate ER [Ms Contin] 30 mg PO Q12HR


   Esomeprazole Magnesium [NexIUM] 40 mg PO DAILY


   Polyethylene Glycol 3350 [Miralax] 17 gm PO DAILY PRN


     PRN Reason: Constipation


Discharge Medication List





Esomeprazole Magnesium [NexIUM] 40 mg PO DAILY 11/12/17 [History]


Morphine Sulfate ER [Ms Contin] 30 mg PO Q12HR 11/12/17 [History]


Polyethylene Glycol 3350 [Miralax] 17 gm PO DAILY PRN 11/12/17 [History]


Simvastatin [Zocor] 20 mg PO HS 11/12/17 [History]


Zonisamide [Zonegran] 200 mg PO HS 11/12/17 [History]


oxyCODONE-APAP 10-325MG [Percocet  mg] 1 tab PO BID 11/12/17 [History]








Follow up Appointment(s)/Referral(s): 


Lester Stallworth DO [Primary Care Provider] - 1-2 days


Discharge Disposition: HOME SELF-CARE
(0) swallows foods/liquids without difficulty

## 2017-11-13 NOTE — XR
EXAMINATION TYPE: XR chest 1V portable

 

DATE OF EXAM: 11/13/2017

 

COMPARISON: 11/12/2017

 

HISTORY: Chest pain

 

TECHNIQUE: Single frontal view of the chest is obtained.

 

FINDINGS:  

No focal infiltrate is identified. Blunting of the right costophrenic angle is felt to reflect small 
effusion.

The cardiac silhouette size is within normal limits.   

The osseous structures are intact.

 

IMPRESSION:  

1. Small right-sided effusion suspected.

## 2017-11-13 NOTE — P.PN
Subjective


Progress Note Date: 11/13/17





41 year old  female who presented to the emergency room on 11/12/2017 

with a chief complaint of chest pain.  The patient's EKG completed in the 

emergency room showed sinus bradycardia with a rate in the 40s.  Her d-dimer 

was elevated at 1.41.  She underwent a CT of the chest which was negative for 

pulmonary embolism but did show small bilateral pleural effusions.





The patient was seen and examined at the bedside on rounds with Dr. Stallworth. She 

states she has been undergoing workup for possible MS and has had high-dose 

steroids for the last 3 days per her neurologist.  She started having chest 

pain that she describes as "squeezing" on the left side of her chest that wraps 

around to the left side of her back.  The pain is not reproducible upon 

palpation.  She states there is nothing that makes it better or worse.  She 

states she is unable to lay down because the pain intensifies when she is 

supine.  She states that she feels like she is unable to take a deep breath.  

She remains on 2 L nasal cannula and is maintaining oxygen saturation greater 

than 92%.  The patient remains bradycardic with a rate ranging from 38-56 

overnight.  Troponins were negative 3.  Lipid panel reveals triglycerides of 66

, cholesterol 136, LDL 68, and HDL 55.  An echo was completed and results are 

currently pending.  Consultations have been placed to cardiology for further 

workup.











Objective





- Vital Signs


Vital signs: 


 Vital Signs











Temp  97.5 F L  11/13/17 07:45


 


Pulse  56 L  11/13/17 07:45


 


Resp  18   11/13/17 07:45


 


BP  140/68   11/13/17 07:45


 


Pulse Ox  100   11/13/17 09:25








 Intake & Output











 11/12/17 11/13/17 11/13/17





 18:59 06:59 18:59


 


Intake Total  1080 


 


Balance  1080 


 


Weight 76.4 kg  


 


Intake:   


 


  Intake, IV Titration  480 





  Amount   


 


    Heparin Sodium,Porcine/  240 





    D5w Pmx 25,000 unit In   





    Dextrose/Water 1 500ml.   





    bag @ 12 UNITS/KG/HR 17.   





    96 mls/hr IV .Q24H JUDY Rx   





    #:[id number]   


 


    Sodium Chloride 0.9% 1,  240 





    000 ml @ 20 mls/hr IV .   





    Q24H JUDY Rx#:[id number]   


 


  Oral  600 


 


Other:   


 


  Voiding Method Toilet Toilet 


 


  # Voids  2 














- Exam





GENERAL: This is a  41-year-old  female in no apparent distress at the 

time of examination. Pleasant and cooperative.


HEENT: Head is atraumatic, normocephalic. Pupils are equal, round, and reactive 

to light. Sclerae anicteric. Conjunctivae are clear. Mucus membranes of the 

mouth are moist. Neck is supple. 


RESPIRATORY: Clear to ausculation. No wheezes, rales, or rhonchi.  No use of 

accessory muscles.  Patient maintaining oxygen saturation greater than 92% on 2 

L nasal cannula. No chest wall tenderness is noted on palpation or with deep 

breathing.


CARDIOVASCULAR: Regular rate and rhythm.  Bradycardic.  S1 and S2 noted.  No 

systolic or diastolic murmur auscultated.  No JVD noted. No S3 or S4 noted.


GASTROINTESTINAL: No distention noted.  Abdomen soft and round.  Normal active 

bowel sounds auscultated X 4 quadrants.  No pain or tenderness noted upon 

palpation.


INTEGUMENTARY: No cyanosis. No jaundice. No rashes noted. No cellulitis noted.


EXTREMITIES: 2+ peripheral pulses. No evidence of peripheral edema. No calf 

tenderness noted.


NEUROLOGIC:  Cranial nerves II-XII intact.


PSYCHIATRIC: Awake, alert, and oriented X 3. Appropriate affect. Intact 

judgement and insight.








- Labs


CBC & Chem 7: 


 11/13/17 05:28





 11/13/17 05:28


Labs: 


 Abnormal Lab Results - Last 24 Hours (Table)











  11/12/17 11/12/17 11/12/17 Range/Units





  12:00 12:00 12:00 


 


WBC   11.0 H   (3.8-10.6)  k/uL


 


Neutrophils #     (1.3-7.7)  k/uL


 


PT     (9.0-12.0)  sec


 


INR     (<1.2)  


 


APTT     (22.0-30.0)  sec


 


D-Dimer     (<0.60)  mg/L FEU


 


Chloride    112 H  ()  mmol/L


 


Carbon Dioxide    21 L  (22-30)  mmol/L


 


Glucose     (74-99)  mg/dL


 


ALT    104 H  (9-52)  U/L


 


Total Creatine Kinase  25 L    ()  U/L


 


Total Protein    5.5 L  (6.3-8.2)  g/dL


 


Albumin    3.0 L  (3.5-5.0)  g/dL














  11/12/17 11/12/17 11/13/17 Range/Units





  12:00 18:24 00:44 


 


WBC     (3.8-10.6)  k/uL


 


Neutrophils #     (1.3-7.7)  k/uL


 


PT  12.4 H    (9.0-12.0)  sec


 


INR  1.2 H    (<1.2)  


 


APTT     (22.0-30.0)  sec


 


D-Dimer  1.41 H    (<0.60)  mg/L FEU


 


Chloride     ()  mmol/L


 


Carbon Dioxide     (22-30)  mmol/L


 


Glucose     (74-99)  mg/dL


 


ALT     (9-52)  U/L


 


Total Creatine Kinase   22 L  22 L  ()  U/L


 


Total Protein     (6.3-8.2)  g/dL


 


Albumin     (3.5-5.0)  g/dL














  11/13/17 11/13/17 11/13/17 Range/Units





  00:44 05:28 05:28 


 


WBC    13.2 H  (3.8-10.6)  k/uL


 


Neutrophils #    9.3 H  (1.3-7.7)  k/uL


 


PT     (9.0-12.0)  sec


 


INR     (<1.2)  


 


APTT  51.7 H    (22.0-30.0)  sec


 


D-Dimer     (<0.60)  mg/L FEU


 


Chloride   110 H   ()  mmol/L


 


Carbon Dioxide     (22-30)  mmol/L


 


Glucose   100 H   (74-99)  mg/dL


 


ALT     (9-52)  U/L


 


Total Creatine Kinase     ()  U/L


 


Total Protein     (6.3-8.2)  g/dL


 


Albumin     (3.5-5.0)  g/dL














Assessment and Plan


Plan: 





ASSESSMENT:





Chest pain, present on admission, troponins negative 3, possible unstable 

angina





Elevated d-dimer, CT chest negative for pulmonary emboli but noted to have 

small bilateral pleural effusions





Sinus bradycardia, asymptomatic





Chronic back pain with recent high-dose steroid infusions for pain control and 

possible multiple sclerosis





History of ulcerative colitis





History of Clostridium difficile





History of tobacco abuse, in remission, patient quit smoking cigarettes in 2011








PLAN:





-Await further recommendations from cardiology


-Await echo results


-Monitor heart rate and blood pressure


-Monitor telemetry


-Continue heparin drip


-Wean oxygen as tolerated


-Home meds as appropriate


-Monitor labs


-GI prophylaxis: Protonix 40 mg by mouth daily


-DVT prophylaxis: Patient currently on heparin drip


-Discharge planning: Patient to return home when stable








Nurse practitioner note has been reviewed by physician. Signing provider agrees 

with the documented findings, assessment, and plan of care.

## 2017-11-22 ENCOUNTER — HOSPITAL ENCOUNTER (OUTPATIENT)
Dept: HOSPITAL 47 - RADMRIMAIN | Age: 42
Discharge: HOME | End: 2017-11-22
Payer: COMMERCIAL

## 2017-11-22 DIAGNOSIS — Z91.041: ICD-10-CM

## 2017-11-22 DIAGNOSIS — M50.221: Primary | ICD-10-CM

## 2017-11-22 DIAGNOSIS — R90.82: ICD-10-CM

## 2017-11-22 PROCEDURE — 72141 MRI NECK SPINE W/O DYE: CPT

## 2017-11-22 PROCEDURE — 70553 MRI BRAIN STEM W/O & W/DYE: CPT

## 2017-11-22 NOTE — MR
EXAMINATION TYPE: MR brain wo/w morgan calvin

 

DATE OF EXAM: 11/22/2017

 

COMPARISON: 9/17/2016 brain

 

HISTORY: Headaches, dizziness, neck pain, white matter changes, MS protocol

 

TECHNIQUE: 

Multiplanar, multisequence images of the brain and brainstem is performed without and with IV contras
t, utilizing 7.5 mL intravenous Gadavist .

 

FINDINGS: Ventricles have normal size. There is no mass effect nor midline shift. There is no sign of
 intracranial hemorrhage. Gray and white matter structures have fairly normal signal pattern. There i
s no evidence of cerebral edema. Contrast images of the brain show no pathologic enhancement. There i
s a single 3 mm focus of increased signal at the gray-white matter junction right parietal lobe.

 

The cervical vertebra have normal spacing and alignment. Posterior elements appear intact. Cervical s
fabian cord has fairly normal signal pattern. There is a thin linear area of increased signal in the c
ervical cord at C6-7 level that is a very tiny syrinx. This only measures up to 2 mm in diameter. I s
ee no mass of the cervical spinal cord.

 

There is a moderate posterior disc herniation at C4-5 and to the spinal canal. There is developmental
ly adequate canal and no spinal stenosis. There is a small posterior disc bulge at C6-7 and C7-T1. I 
do not see evidence of demyelinating disease in the cervical spinal cord.

IMPRESSION: There is a moderate posterior disc herniation at C4-5 but no significant impingement on t
he spinal cord. There is developmentally adequate spinal canal.

 

Small syrinx in the cervical cord at C6-C7 level.

 

Single small focus of increased signal in the right parietal lobe gray-white matter junction. I overa
ll have a very low suspicion of demyelinating disease. Brain appears unchanged compared to old exam.

## 2018-05-09 ENCOUNTER — HOSPITAL ENCOUNTER (OUTPATIENT)
Dept: HOSPITAL 47 - LABWHC1 | Age: 43
Discharge: HOME | End: 2018-05-09
Payer: COMMERCIAL

## 2018-05-09 DIAGNOSIS — M35.3: ICD-10-CM

## 2018-05-09 DIAGNOSIS — M06.9: ICD-10-CM

## 2018-05-09 DIAGNOSIS — R21: Primary | ICD-10-CM

## 2018-05-09 LAB
BASOPHILS # BLD AUTO: 0 K/UL (ref 0–0.2)
BASOPHILS NFR BLD AUTO: 0 %
DSDNA AB SER QL: NEGATIVE
DSDNA AB TITR SER: <1 IU/ML
EOSINOPHIL # BLD AUTO: 0.1 K/UL (ref 0–0.7)
EOSINOPHIL NFR BLD AUTO: 2 %
ERYTHROCYTE [DISTWIDTH] IN BLOOD BY AUTOMATED COUNT: 4.57 M/UL (ref 3.8–5.4)
ERYTHROCYTE [DISTWIDTH] IN BLOOD: 13 % (ref 11.5–15.5)
HCT VFR BLD AUTO: 38.8 % (ref 34–46)
HGB BLD-MCNC: 13.2 GM/DL (ref 11.4–16)
LYMPHOCYTES # SPEC AUTO: 2.7 K/UL (ref 1–4.8)
LYMPHOCYTES NFR SPEC AUTO: 39 %
MCH RBC QN AUTO: 28.9 PG (ref 25–35)
MCHC RBC AUTO-ENTMCNC: 34.2 G/DL (ref 31–37)
MCV RBC AUTO: 84.7 FL (ref 80–100)
MONOCYTES # BLD AUTO: 0.4 K/UL (ref 0–1)
MONOCYTES NFR BLD AUTO: 5 %
NEUTROPHILS # BLD AUTO: 3.6 K/UL (ref 1.3–7.7)
NEUTROPHILS NFR BLD AUTO: 52 %
PLATELET # BLD AUTO: 280 K/UL (ref 150–450)
WBC # BLD AUTO: 6.9 K/UL (ref 3.8–10.6)

## 2018-05-09 PROCEDURE — 86038 ANTINUCLEAR ANTIBODIES: CPT

## 2018-05-09 PROCEDURE — 36415 COLL VENOUS BLD VENIPUNCTURE: CPT

## 2018-05-09 PROCEDURE — 86140 C-REACTIVE PROTEIN: CPT

## 2018-05-09 PROCEDURE — 86225 DNA ANTIBODY NATIVE: CPT

## 2018-05-09 PROCEDURE — 85652 RBC SED RATE AUTOMATED: CPT

## 2018-05-09 PROCEDURE — 85613 RUSSELL VIPER VENOM DILUTED: CPT

## 2018-05-09 PROCEDURE — 86431 RHEUMATOID FACTOR QUANT: CPT

## 2018-05-09 PROCEDURE — 85730 THROMBOPLASTIN TIME PARTIAL: CPT

## 2018-05-09 PROCEDURE — 85025 COMPLETE CBC W/AUTO DIFF WBC: CPT

## 2018-05-10 LAB
APTT BLD: 35 SEC(S) (ref ?–43)
SCREEN DRVVT: 36 SEC(S) (ref ?–44)

## 2018-06-12 ENCOUNTER — HOSPITAL ENCOUNTER (OUTPATIENT)
Dept: HOSPITAL 47 - LABPAT | Age: 43
Discharge: HOME | End: 2018-06-12
Payer: COMMERCIAL

## 2018-06-12 DIAGNOSIS — Z01.818: Primary | ICD-10-CM

## 2018-06-12 PROCEDURE — 93005 ELECTROCARDIOGRAM TRACING: CPT

## 2018-08-28 ENCOUNTER — HOSPITAL ENCOUNTER (OUTPATIENT)
Dept: HOSPITAL 47 - RADMRIMAIN | Age: 43
Discharge: HOME | End: 2018-08-28
Attending: NURSE PRACTITIONER
Payer: COMMERCIAL

## 2018-08-28 DIAGNOSIS — M54.6: ICD-10-CM

## 2018-08-28 DIAGNOSIS — Z91.041: ICD-10-CM

## 2018-08-28 DIAGNOSIS — M51.34: Primary | ICD-10-CM

## 2018-08-28 PROCEDURE — 72146 MRI CHEST SPINE W/O DYE: CPT

## 2018-08-29 NOTE — MR
MR thoracic spine

 

HISTORY: Thoracic spine pain

 

Multiplanar multisequence imaging through the thoracic spine

 

Correlation to prior thoracic spine MRI dated 1/12/2017

 

Thoracic vertebral bodies show stable height, alignment, and bone marrow signal. Multilevel spondylos
is with minimal endplate discogenic marrow signal change. Mild spinal curvature is again seen. There 
is no significant foraminal encroachment or central canal stenosis. Mild multilevel facet arthropathy
 changes are present. Mild multilevel disc bulges are also seen. Thoracic cord signal is normal.

 

C4-5 shows a small posterior disc bulge causing slight anterior mass effect on the thecal sac

 

T6-7 shows a small posterior disc bulge causing minimal anterior mass effect on the thecal sac

 

T7-8 shows a small posterior disc bulge causing slight anterior mass effect on the thecal sac

 

T8 shows a small left paracentral disc bulge causing slight anterior mass effect on the thecal sac.

 

IMPRESSION: Mild degenerative disc disease. Similar appearance to previous exam.

## 2019-01-11 ENCOUNTER — HOSPITAL ENCOUNTER (OUTPATIENT)
Dept: HOSPITAL 47 - LABWHC1 | Age: 44
Discharge: HOME | End: 2019-01-11
Attending: NURSE PRACTITIONER
Payer: COMMERCIAL

## 2019-01-11 DIAGNOSIS — Z01.812: Primary | ICD-10-CM

## 2019-01-11 LAB — BUN SERPL-SCNC: 13 MG/DL (ref 9–27)

## 2019-01-11 PROCEDURE — 84520 ASSAY OF UREA NITROGEN: CPT

## 2019-01-11 PROCEDURE — 36415 COLL VENOUS BLD VENIPUNCTURE: CPT

## 2019-01-11 PROCEDURE — 82565 ASSAY OF CREATININE: CPT

## 2019-09-26 ENCOUNTER — HOSPITAL ENCOUNTER (OUTPATIENT)
Dept: HOSPITAL 47 - NEUROMAIN | Age: 44
Discharge: HOME | End: 2019-09-26
Attending: PSYCHIATRY & NEUROLOGY
Payer: COMMERCIAL

## 2019-09-26 DIAGNOSIS — H53.8: Primary | ICD-10-CM

## 2019-09-26 DIAGNOSIS — R42: ICD-10-CM

## 2019-09-26 DIAGNOSIS — E55.9: ICD-10-CM

## 2019-09-26 LAB
ALBUMIN SERPL-MCNC: 4.4 G/DL (ref 3.5–5)
ALP SERPL-CCNC: 56 U/L (ref 38–126)
ALT SERPL-CCNC: 30 U/L (ref 9–52)
ANION GAP SERPL CALC-SCNC: 8 MMOL/L
AST SERPL-CCNC: 22 U/L (ref 14–36)
BUN SERPL-SCNC: 13 MG/DL (ref 7–17)
CALCIUM SPEC-MCNC: 9.7 MG/DL (ref 8.4–10.2)
CHLORIDE SERPL-SCNC: 106 MMOL/L (ref 98–107)
CO2 SERPL-SCNC: 26 MMOL/L (ref 22–30)
ERYTHROCYTE [DISTWIDTH] IN BLOOD BY AUTOMATED COUNT: 4.98 M/UL (ref 3.8–5.4)
ERYTHROCYTE [DISTWIDTH] IN BLOOD: 13.2 % (ref 11.5–15.5)
GLUCOSE SERPL-MCNC: 115 MG/DL (ref 74–99)
HCT VFR BLD AUTO: 42.3 % (ref 34–46)
HGB BLD-MCNC: 14.7 GM/DL (ref 11.4–16)
MCH RBC QN AUTO: 29.4 PG (ref 25–35)
MCHC RBC AUTO-ENTMCNC: 34.7 G/DL (ref 31–37)
MCV RBC AUTO: 84.9 FL (ref 80–100)
PLATELET # BLD AUTO: 265 K/UL (ref 150–450)
POTASSIUM SERPL-SCNC: 4.7 MMOL/L (ref 3.5–5.1)
PROT SERPL-MCNC: 7.4 G/DL (ref 6.3–8.2)
SODIUM SERPL-SCNC: 140 MMOL/L (ref 137–145)
T4 FREE SERPL-MCNC: 0.87 NG/DL (ref 0.78–2.19)
WBC # BLD AUTO: 8.8 K/UL (ref 3.8–10.6)

## 2019-09-26 PROCEDURE — 84443 ASSAY THYROID STIM HORMONE: CPT

## 2019-09-26 PROCEDURE — 84439 ASSAY OF FREE THYROXINE: CPT

## 2019-09-26 PROCEDURE — 82607 VITAMIN B-12: CPT

## 2019-09-26 PROCEDURE — 95816 EEG AWAKE AND DROWSY: CPT

## 2019-09-26 PROCEDURE — 85027 COMPLETE CBC AUTOMATED: CPT

## 2019-09-26 PROCEDURE — 84481 FREE ASSAY (FT-3): CPT

## 2019-09-26 PROCEDURE — 80053 COMPREHEN METABOLIC PANEL: CPT

## 2019-09-26 PROCEDURE — 82306 VITAMIN D 25 HYDROXY: CPT

## 2019-09-26 PROCEDURE — 84207 ASSAY OF VITAMIN B-6: CPT

## 2019-09-27 ENCOUNTER — HOSPITAL ENCOUNTER (OUTPATIENT)
Dept: HOSPITAL 47 - RADMRIMAIN | Age: 44
End: 2019-09-27
Attending: PSYCHIATRY & NEUROLOGY
Payer: COMMERCIAL

## 2019-09-27 DIAGNOSIS — H53.8: Primary | ICD-10-CM

## 2019-09-27 DIAGNOSIS — M54.81: ICD-10-CM

## 2019-09-27 DIAGNOSIS — R42: ICD-10-CM

## 2019-09-27 DIAGNOSIS — R90.82: ICD-10-CM

## 2019-09-27 PROCEDURE — 70553 MRI BRAIN STEM W/O & W/DYE: CPT

## 2019-09-27 PROCEDURE — 70544 MR ANGIOGRAPHY HEAD W/O DYE: CPT

## 2019-09-27 PROCEDURE — 70549 MR ANGIOGRAPH NECK W/O&W/DYE: CPT

## 2019-09-27 NOTE — MR
EXAMINATION TYPE: MR brain wo/w con

 

DATE OF EXAM: 9/27/2019

 

COMPARISON: 11/22/2017

 

HISTORY: Dizziness / Visual disturbance

 

TECHNIQUE: 

Multiplanar, multisequence images of the brain and brainstem is performed without and with IV contras
t, utilizing 7.5 mL intravenous Gadavist .

 

FINDINGS: Ventricles have normal size. There is no mass effect nor midline shift. There is no sign of
 intracranial hemorrhage. Gray-white matter structures have normal signal pattern. There is no eviden
ce of cerebral edema. There is no evidence of cortical infarct. There is mild ethmoid sinus mucosal t
hickening.

 

Brainstem is intact. Sella turcica appears normal. Corpus callosum is normal. There is no pathologic 
enhancement. There is normal contrast opacification of the venous sinuses. There is no evidence of ce
rebral edema. There is on the FLAIR images a single 4 mm high signal focus in the subcortical right p
arietal lobe.

IMPRESSION: Minimal ethmoid sinusitis unchanged. Single right parietal small high signal focus in the
 subcortical region unchanged compared to old exam. Clinical significance is not clear. No evidence o
f cortical infarct.

## 2019-09-27 NOTE — MR
EXAMINATION TYPE: MR angio head wo/neck wo/w con

 

DATE OF EXAM: 9/27/2019

 

COMPARISON: None

 

HISTORY: Dizziness / Visual disturbance

 

TECHNIQUE: Time of flight images focusing on the [place] were performed without contrast..  
2-D and 3-D postprocessing imaging is performed.

Contrast images were obtained with gadolinium 7.5 mL.

FINDINGS: There is arterial flow in the anterior middle and posterior cerebral arteries. There is sma
ll diameter of the proximal right anterior cerebral artery. There is patency of the left posterior co
mmunicating artery. Right anterior cerebral arteries probably filled to a large extent through the an
terior communicating artery. There is no mass effect. There is no evidence of intracranial aneurysm o
r neovascularity. There is arterial flow in the vertebrobasilar artery system. There is arterial flow
 in the intracranial internal carotid arteries with normal diameter.

 

There is arterial flow in the common internal and external carotid arteries bilaterally. There is art
erial flow in the vertebral arteries bilaterally. There is normal branching pattern of the great vess
els on the aortic arch. I see no evidence of hemodynamic stenosis of the carotid or vertebral arterie
s. Vertebral arteries are symmetric.

 

IMPRESSION: Negative CT angiogram of the neck.

 

Diminutive proximal right anterior cerebral artery. It is not clear if there is acquired hemodynamic 
stenosis or developmentally small vessel. I think this is more likely developmental abnormality. No i
ntracranial aneurysm.

## 2019-09-29 NOTE — EEG
ELECTROENCEPHALOGRAM REPORT



PROCEDURE DATE:

09/26/2019.



ELECTROENCEPHALOGRAM (EEG) REPORT:



TECHNIQUE:

A routine 18 channel EEG was performed with video using the 10/20 international

placement system.



HISTORY:

Dizziness and visual changes.



CURRENT MEDICATIONS:

Percocet, simvastatin, Nexium.



STUDY DURATION:

25 minutes.



FINDINGS:

Background:  The background activity consisted of 9-10 hertz rhythmic waveforms

symmetrically seen over both posterior quadrants.



ACTIVATION:

Hyperventilation:  Not performed.

Photic stimulation:  Symmetric driving seen.

Sleep:  Stages 1 and 2 sleep noted.



ABNORMALITIES:

None.



Please note that 1 channel of this EEG was dedicated to EKG.  It demonstrated a sinus

rhythm.



IMPRESSION:

Normal EEG.  No epileptiform activity was present.  No seizures were recorded.





MMODL / IJN: [id number] / Job#: [id number]

## 2020-01-16 ENCOUNTER — HOSPITAL ENCOUNTER (OUTPATIENT)
Dept: HOSPITAL 47 - LABWHC1 | Age: 45
Discharge: HOME | End: 2020-01-16
Attending: NURSE PRACTITIONER
Payer: COMMERCIAL

## 2020-01-16 DIAGNOSIS — R53.83: ICD-10-CM

## 2020-01-16 DIAGNOSIS — E55.9: Primary | ICD-10-CM

## 2020-01-16 LAB
ALBUMIN SERPL-MCNC: 4.4 G/DL (ref 3.8–4.9)
ALBUMIN/GLOB SERPL: 2.2 G/DL (ref 1.6–3.17)
ALP SERPL-CCNC: 95 U/L (ref 41–126)
ALT SERPL-CCNC: 57 U/L (ref 8–44)
ANION GAP SERPL CALC-SCNC: 9.5 MMOL/L (ref 4–12)
AST SERPL-CCNC: 33 U/L (ref 13–35)
BASOPHILS # BLD AUTO: 0.1 K/UL (ref 0–0.2)
BASOPHILS NFR BLD AUTO: 1 %
BUN SERPL-SCNC: 11 MG/DL (ref 9–27)
BUN/CREAT SERPL: 13.75 RATIO (ref 12–20)
CALCIUM SPEC-MCNC: 9.2 MG/DL (ref 8.7–10.3)
CHLORIDE SERPL-SCNC: 104 MMOL/L (ref 96–109)
CO2 SERPL-SCNC: 24.5 MMOL/L (ref 21.6–31.8)
EOSINOPHIL # BLD AUTO: 0.3 K/UL (ref 0–0.7)
EOSINOPHIL NFR BLD AUTO: 3 %
ERYTHROCYTE [DISTWIDTH] IN BLOOD BY AUTOMATED COUNT: 4.93 M/UL (ref 3.8–5.4)
ERYTHROCYTE [DISTWIDTH] IN BLOOD: 12.7 % (ref 11.5–15.5)
GLOBULIN SER CALC-MCNC: 2 G/DL (ref 1.6–3.3)
GLUCOSE SERPL-MCNC: 94 MG/DL (ref 70–110)
HCT VFR BLD AUTO: 42.2 % (ref 34–46)
HGB BLD-MCNC: 14.4 GM/DL (ref 11.4–16)
LYMPHOCYTES # SPEC AUTO: 3.1 K/UL (ref 1–4.8)
LYMPHOCYTES NFR SPEC AUTO: 36 %
MCH RBC QN AUTO: 29.1 PG (ref 25–35)
MCHC RBC AUTO-ENTMCNC: 34 G/DL (ref 31–37)
MCV RBC AUTO: 85.6 FL (ref 80–100)
MONOCYTES # BLD AUTO: 0.3 K/UL (ref 0–1)
MONOCYTES NFR BLD AUTO: 4 %
NEUTROPHILS # BLD AUTO: 4.8 K/UL (ref 1.3–7.7)
NEUTROPHILS NFR BLD AUTO: 55 %
PLATELET # BLD AUTO: 321 K/UL (ref 150–450)
POTASSIUM SERPL-SCNC: 4.8 MMOL/L (ref 3.5–5.5)
PROT SERPL-MCNC: 6.4 G/DL (ref 6.2–8.2)
SODIUM SERPL-SCNC: 138 MMOL/L (ref 135–145)
T4 FREE SERPL-MCNC: 1.1 NG/DL (ref 0.8–1.8)
VIT B12 SERPL-MCNC: 561 PG/ML (ref 200–944)
WBC # BLD AUTO: 8.7 K/UL (ref 3.8–10.6)

## 2020-01-16 PROCEDURE — 82306 VITAMIN D 25 HYDROXY: CPT

## 2020-01-16 PROCEDURE — 84439 ASSAY OF FREE THYROXINE: CPT

## 2020-01-16 PROCEDURE — 84207 ASSAY OF VITAMIN B-6: CPT

## 2020-01-16 PROCEDURE — 85025 COMPLETE CBC W/AUTO DIFF WBC: CPT

## 2020-01-16 PROCEDURE — 82607 VITAMIN B-12: CPT

## 2020-01-16 PROCEDURE — 84481 FREE ASSAY (FT-3): CPT

## 2020-01-16 PROCEDURE — 36415 COLL VENOUS BLD VENIPUNCTURE: CPT

## 2020-01-16 PROCEDURE — 80053 COMPREHEN METABOLIC PANEL: CPT

## 2020-01-16 PROCEDURE — 84443 ASSAY THYROID STIM HORMONE: CPT

## 2020-01-20 ENCOUNTER — HOSPITAL ENCOUNTER (EMERGENCY)
Dept: HOSPITAL 47 - EC | Age: 45
Discharge: HOME | End: 2020-01-20
Payer: COMMERCIAL

## 2020-01-20 VITALS — RESPIRATION RATE: 16 BRPM | SYSTOLIC BLOOD PRESSURE: 132 MMHG | DIASTOLIC BLOOD PRESSURE: 77 MMHG | HEART RATE: 91 BPM

## 2020-01-20 VITALS — TEMPERATURE: 99.8 F

## 2020-01-20 DIAGNOSIS — Z79.891: ICD-10-CM

## 2020-01-20 DIAGNOSIS — Z79.899: ICD-10-CM

## 2020-01-20 DIAGNOSIS — T78.3XXA: Primary | ICD-10-CM

## 2020-01-20 DIAGNOSIS — Z87.891: ICD-10-CM

## 2020-01-20 DIAGNOSIS — Z91.041: ICD-10-CM

## 2020-01-20 PROCEDURE — 96374 THER/PROPH/DIAG INJ IV PUSH: CPT

## 2020-01-20 PROCEDURE — 96375 TX/PRO/DX INJ NEW DRUG ADDON: CPT

## 2020-01-20 PROCEDURE — 99283 EMERGENCY DEPT VISIT LOW MDM: CPT

## 2020-01-20 NOTE — ED
General Adult HPI





- General


Chief complaint: ENT


Stated complaint: throat closing up


Time Seen by Provider: 20 20:27


Source: patient


Mode of arrival: ambulatory


Limitations: no limitations





- History of Present Illness


Initial comments: 


Dictation was produced using dragon dictation software. please excuse any 

grammatical, word or spelling errors. 





Chief Complaint: 44-year-old female presents with sensation of throat and tongue

swelling since this morning





History of Present Illness: 44-year-old female she presents today with chief 

complaint of throat and tongue swelling.  Patient states that she just completed

a course of Augmentin.  Just takes Nexium and Percocet.  Patient states that her

symptoms were mild this morning however increased to today.  Patient denies any 

history of angioedema.  Denies any lisinopril and her medication list.  No 

hereditary history of angioedema.  Patient has any trouble breathing.  She is de

nies any trouble swallowing.  States that she feels as though her left tongue is

slightly painful.  She does also have sore throat with swallowing.








The ROS documented in this emergency department record has been reviewed and 

confirmed by me.  Those systems with pertinent positive or negative responses 

have been documented in the HPI.  All other systems are other negative and/or 

noncontributory.








PHYSICAL EXAM:


General Impression: Alert and oriented x3, not in acute distress, no drooling


HEENT: Normocephalic atraumatic, extra-ocular movements intact, pupils equal and

reactive to light bilaterally, mucous membranes moist, normal phonation, also 

seen at the soft palate in the posterior left, no appreciable tongue swelling, 

uvula midline no asymmetry noted,


Cardiovascular: Heart regular rate and rhythm, S1&S2 audible, no murmurs, rubs 

or gallops


Chest: Lungs clear to auscultation bilaterally, no rhonchi, no wheeze, no rales


Abdomen: Bowel sounds present, abdomen soft, non-tender, non-distended, no 

organomegaly


Musculoskeletal: Pulses present and equal in all extremities, no peripheral 

edema


Motor:  no focal deficits noted


Neurological: CN II-XII grossly intact, no focal motor or sensory deficits noted


Skin: Intact with no visualized rashes


Psych: Normal affect and mood





ED course:43 yo female with chief complaint of throat and tongue swelling.  I'll

shows temperature of 99.8, rest of vital signs within acceptable limits.  

Patient is recently Augmentin for treatment of sinusitis.  Patient is well-

appearing at bedside.  No significant physical exam findings to suggest angioede

ma at this time.  She reports that her symptoms began upon waking this morning. 

There is an ulcer that is seen in the soft palate of the posterior oropharynx 

likely causing patient's odynophagia.  She is well appearing showing no signs of

drooling or respiratory distress.





Given Decadron Pepcid and diphenhydramine.  She is observed in emergency 

department for 2-1/2 hours with no progression of symptoms.  Discussed with 

patient that she has symptoms suggesting angioedema.  Her physical exam is not 

impressive for angioedema.  She states her symptoms started at 5 AM this 

morning.  There is likely will be no progression of her symptoms she is advised 

to follow-up with primary care physician upon discharge.  Discussed with patient

that there is concern that perhaps this is secondary to an ALLERGY.  She is 

still to be mindful of her exposures and if she develop symptoms again.  She is 

otherwise advised follow-up with primary care physician upon discharge.  Return 

parameters discussed.  All questions answered.











- Related Data


                                Home Medications











 Medication  Instructions  Recorded  Confirmed


 


Esomeprazole Magnesium [NexIUM] 40 mg PO DAILY 17


 


Morphine Sulfate ER [Ms Contin] 30 mg PO Q12HR 17


 


Polyethylene Glycol 3350 [Miralax] 17 gm PO DAILY PRN 17


 


Simvastatin [Zocor] 20 mg PO HS 17


 


Zonisamide [Zonegran] 200 mg PO HS 17


 


oxyCODONE-APAP 10-325MG [Percocet 1 tab PO BID 17





 mg]   











                                    Allergies











Allergy/AdvReac Type Severity Reaction Status Date / Time


 


Iodinated Contrast Media Allergy  Itching Verified 20 20:06





[Iodinated Contrast- Oral     





and IV Dye]     














Review of Systems


ROS Statement: 


Those systems with pertinent positive or pertinent negative responses have been 

documented in the HPI.





ROS Other: All systems not noted in ROS Statement are negative.





Past Medical History


Past Medical History: No Reported History


Additional Past Medical History / Comment(s): chronic back pain- pt recieving 

infusions and rule out MS.


History of Any Multi-Drug Resistant Organisms: C-DIFF


Date of last positivie culture/infection: 


MDRO Source:: stool


Past Surgical History: Breast Surgery, Tonsillectomy


Additional Past Surgical History / Comment(s): lumpectomy


Past Anesthesia/Blood Transfusion Reactions: No Reported Reaction


Past Psychological History: No Psychological Hx Reported


Smoking Status: Former smoker


Past Alcohol Use History: None Reported


Past Drug Use History: None Reported





- Past Family History


  ** Father


Family Medical History: Diabetes Mellitus


Additional Family Medical History / Comment(s): heart disease.  at 22 from 

car accident.





  ** Mother


Family Medical History: No Reported History





General Exam


Limitations: no limitations





Course


                                   Vital Signs











  20





  20:06 20:23 21:59


 


Temperature 99.8 F H  


 


Pulse Rate 87  91


 


Respiratory 18 20 16





Rate   


 


Blood Pressure 143/77  132/77


 


O2 Sat by Pulse 97  98





Oximetry   














Disposition


Clinical Impression: 


 Angioedema





Disposition: HOME SELF-CARE


Condition: Good


Instructions (If sedation given, give patient instructions):  Angioedema (ED)


Is patient prescribed a controlled substance at d/c from ED?: No


Referrals: 


Lester Stallworth DO [Primary Care Provider] - 1-2 days


Time of Disposition: 22:36

## 2020-02-11 ENCOUNTER — HOSPITAL ENCOUNTER (OUTPATIENT)
Dept: HOSPITAL 47 - LABWHC1 | Age: 45
Discharge: HOME | End: 2020-02-11
Attending: PSYCHIATRY & NEUROLOGY
Payer: COMMERCIAL

## 2020-02-11 DIAGNOSIS — Z77.29: Primary | ICD-10-CM

## 2020-02-11 LAB — HBV SURFACE AB SERPL IA-ACNC: 278.7 MIU/ML

## 2020-02-11 PROCEDURE — 86706 HEP B SURFACE ANTIBODY: CPT

## 2020-02-11 PROCEDURE — 86803 HEPATITIS C AB TEST: CPT

## 2020-02-11 PROCEDURE — 36415 COLL VENOUS BLD VENIPUNCTURE: CPT

## 2020-02-11 PROCEDURE — 87390 HIV-1 AG IA: CPT

## 2020-05-28 ENCOUNTER — HOSPITAL ENCOUNTER (OUTPATIENT)
Dept: HOSPITAL 47 - RADMAMWWP | Age: 45
Discharge: HOME | End: 2020-05-28
Attending: OBSTETRICS & GYNECOLOGY
Payer: COMMERCIAL

## 2020-05-28 DIAGNOSIS — Z12.31: Primary | ICD-10-CM

## 2020-05-28 PROCEDURE — 77063 BREAST TOMOSYNTHESIS BI: CPT

## 2020-05-28 PROCEDURE — 77067 SCR MAMMO BI INCL CAD: CPT

## 2020-06-02 NOTE — MM
Reason for exam: screening  (asymptomatic).

Last mammogram was performed 6 years and 2 months ago.



History:

Family history of breast cancer in maternal aunt and breast cancer in paternal 

grandmother.

Benign excisional biopsy of the right breast, 2016.

Took hormonal contraceptives for 12 years beginning at age 18.



Physical Findings:

A clinical breast exam by your physician is recommended on an annual basis and 

results should be correlated with mammographic findings.



MG 3D Screening Mammo W/Cad

Bilateral CC and MLO view(s) were taken.

Prior study comparison: April 3, 2014, bilateral digital screening mammo w/CAD.

The breast tissue is heterogeneously dense. This may lower the sensitivity of 

mammography.  Anterior asymmetric density on the right and focal asymmetry on the 

left more defined from 2014.





ASSESSMENT: Incomplete: need additional imaging evaluation, BI-RAD 0



RECOMMENDATION:

Special view mammogram of both breasts.

(3D)



If lesion persists on supplemental views, image directed ultrasound is 

recommended.



[facility] will attempt to contact patient to return for supplemental 

views and ultrasound if indicated.

## 2020-06-18 ENCOUNTER — HOSPITAL ENCOUNTER (OUTPATIENT)
Dept: HOSPITAL 47 - RADMAMWWP | Age: 45
Discharge: HOME | End: 2020-06-18
Attending: OBSTETRICS & GYNECOLOGY
Payer: COMMERCIAL

## 2020-06-18 DIAGNOSIS — R92.8: Primary | ICD-10-CM

## 2020-06-18 PROCEDURE — 77066 DX MAMMO INCL CAD BI: CPT

## 2020-06-18 PROCEDURE — 77062 BREAST TOMOSYNTHESIS BI: CPT

## 2020-06-18 NOTE — MM
Reason for exam: additional evaluation requested from abnormal screening.

Last mammogram was performed 1 month ago.



History:

Family history of breast cancer in maternal aunt and breast cancer in paternal 

grandmother.

Benign excisional biopsy of the right breast, 2016.

Took hormonal contraceptives for 12 years beginning at age 18.



Physical Findings:

Nurse did not find any significant physical abnormalities on exam.



MG 3D Work Up W/Cad KIERA

Bilateral spot compression CC, spot compression MLO, and ML view(s) were taken.

Prior study comparison: May 28, 2020, bilateral MG 3d screening mammo w/cad.  

April 3, 2014, bilateral digital screening mammo w/CAD.

The breast tissue is heterogeneously dense. This may lower the sensitivity of 

mammography.  No distinct lesion persists on additional views.



These results were verbally communicated with the patient and result sheet given 

to the patient on 6/18/20.





ASSESSMENT: Negative, BI-RAD 1



RECOMMENDATION:

Return to routine screening mammogram schedule for both breasts.

## 2020-08-31 ENCOUNTER — HOSPITAL ENCOUNTER (EMERGENCY)
Dept: HOSPITAL 47 - EC | Age: 45
Discharge: HOME | End: 2020-08-31
Payer: COMMERCIAL

## 2020-08-31 VITALS — DIASTOLIC BLOOD PRESSURE: 86 MMHG | HEART RATE: 71 BPM | SYSTOLIC BLOOD PRESSURE: 129 MMHG

## 2020-08-31 VITALS — TEMPERATURE: 98.6 F | RESPIRATION RATE: 16 BRPM

## 2020-08-31 DIAGNOSIS — Z91.041: ICD-10-CM

## 2020-08-31 DIAGNOSIS — F17.200: ICD-10-CM

## 2020-08-31 DIAGNOSIS — R10.813: ICD-10-CM

## 2020-08-31 DIAGNOSIS — Z79.899: ICD-10-CM

## 2020-08-31 DIAGNOSIS — R10.30: Primary | ICD-10-CM

## 2020-08-31 LAB
ALBUMIN SERPL-MCNC: 4.3 G/DL (ref 3.5–5)
ALP SERPL-CCNC: 61 U/L (ref 38–126)
ALT SERPL-CCNC: 23 U/L (ref 4–34)
AMYLASE SERPL-CCNC: 55 U/L (ref 30–110)
ANION GAP SERPL CALC-SCNC: 6 MMOL/L
APTT BLD: 22.6 SEC (ref 22–30)
AST SERPL-CCNC: 25 U/L (ref 14–36)
BASOPHILS # BLD AUTO: 0.1 K/UL (ref 0–0.2)
BASOPHILS NFR BLD AUTO: 1 %
BUN SERPL-SCNC: 9 MG/DL (ref 7–17)
CALCIUM SPEC-MCNC: 9.7 MG/DL (ref 8.4–10.2)
CHLORIDE SERPL-SCNC: 106 MMOL/L (ref 98–107)
CO2 SERPL-SCNC: 24 MMOL/L (ref 22–30)
EOSINOPHIL # BLD AUTO: 0.4 K/UL (ref 0–0.7)
EOSINOPHIL NFR BLD AUTO: 4 %
ERYTHROCYTE [DISTWIDTH] IN BLOOD BY AUTOMATED COUNT: 5.01 M/UL (ref 3.8–5.4)
ERYTHROCYTE [DISTWIDTH] IN BLOOD: 13.1 % (ref 11.5–15.5)
GLUCOSE SERPL-MCNC: 107 MG/DL (ref 74–99)
HCT VFR BLD AUTO: 43.2 % (ref 34–46)
HGB BLD-MCNC: 14.5 GM/DL (ref 11.4–16)
HYALINE CASTS UR QL AUTO: 1 /LPF (ref 0–2)
INR PPP: 1 (ref ?–1.2)
LYMPHOCYTES # SPEC AUTO: 3.2 K/UL (ref 1–4.8)
LYMPHOCYTES NFR SPEC AUTO: 32 %
MCH RBC QN AUTO: 28.9 PG (ref 25–35)
MCHC RBC AUTO-ENTMCNC: 33.5 G/DL (ref 31–37)
MCV RBC AUTO: 86.1 FL (ref 80–100)
MONOCYTES # BLD AUTO: 0.5 K/UL (ref 0–1)
MONOCYTES NFR BLD AUTO: 5 %
NEUTROPHILS # BLD AUTO: 5.7 K/UL (ref 1.3–7.7)
NEUTROPHILS NFR BLD AUTO: 57 %
PH UR: 5.5 [PH] (ref 5–8)
PLATELET # BLD AUTO: 300 K/UL (ref 150–450)
POTASSIUM SERPL-SCNC: 3.9 MMOL/L (ref 3.5–5.1)
PROT SERPL-MCNC: 6.9 G/DL (ref 6.3–8.2)
PT BLD: 10.1 SEC (ref 9–12)
RBC UR QL: 1 /HPF (ref 0–5)
SODIUM SERPL-SCNC: 136 MMOL/L (ref 137–145)
SP GR UR: 1.02 (ref 1–1.03)
SQUAMOUS UR QL AUTO: 1 /HPF (ref 0–4)
UROBILINOGEN UR QL STRIP: 2 MG/DL (ref ?–2)
WBC # BLD AUTO: 10.1 K/UL (ref 3.8–10.6)
WBC # UR AUTO: <1 /HPF (ref 0–5)

## 2020-08-31 PROCEDURE — 85610 PROTHROMBIN TIME: CPT

## 2020-08-31 PROCEDURE — 74176 CT ABD & PELVIS W/O CONTRAST: CPT

## 2020-08-31 PROCEDURE — 81001 URINALYSIS AUTO W/SCOPE: CPT

## 2020-08-31 PROCEDURE — 99284 EMERGENCY DEPT VISIT MOD MDM: CPT

## 2020-08-31 PROCEDURE — 96374 THER/PROPH/DIAG INJ IV PUSH: CPT

## 2020-08-31 PROCEDURE — 81025 URINE PREGNANCY TEST: CPT

## 2020-08-31 PROCEDURE — 36415 COLL VENOUS BLD VENIPUNCTURE: CPT

## 2020-08-31 PROCEDURE — 85025 COMPLETE CBC W/AUTO DIFF WBC: CPT

## 2020-08-31 PROCEDURE — 96361 HYDRATE IV INFUSION ADD-ON: CPT

## 2020-08-31 PROCEDURE — 80053 COMPREHEN METABOLIC PANEL: CPT

## 2020-08-31 PROCEDURE — 82150 ASSAY OF AMYLASE: CPT

## 2020-08-31 PROCEDURE — 83690 ASSAY OF LIPASE: CPT

## 2020-08-31 PROCEDURE — 85730 THROMBOPLASTIN TIME PARTIAL: CPT

## 2020-08-31 NOTE — CT
EXAMINATION TYPE: CT abdomen pelvis wo con

 

DATE OF EXAM: 8/31/2020

 

COMPARISON: 2/8/2008

 

HISTORY: Right inguinal pain.

 

CT DLP: 599.7 mGycm

Automated exposure control for dose reduction was used.

 

Lung bases are clear. There is no pleural effusion. Heart size is normal. Liver spleen pancreas gallb
ladder stomach appear intact. Bile ducts are not dilated.

 

There is no adrenal mass. Kidneys show normal size and contour. There is no hydronephrosis. Ureters a
re not dilated. There is no retroperitoneal adenopathy. Bladder distends smoothly. There is no inguin
al hernia. There is no free fluid in the pelvis. Lumbar vertebra have normal spacing and alignment. P
osterior elements are intact. Bony pelvis is intact. Uterus is anteverted. There is no evidence of a 
pelvic mass.

 

Appendix is posterior and appears normal. There is no mesenteric edema. There is no ascites or free a
ir. There is no bowel obstruction.

 

IMPRESSION:

Negative CT scan abdomen and pelvis. Normal appendix. No evidence of inguinal hernia. No adverse tsai
ge compared to old exam.

## 2020-08-31 NOTE — ED
General Adult HPI





- General


Chief complaint: Abdominal Pain


Stated complaint: lower abd pain


Time Seen by Provider: 20 16:30


Source: patient, family, RN notes reviewed


Mode of arrival: ambulatory


Limitations: no limitations





- History of Present Illness


Initial comments: 





Patient is a pleasant 44-year-old female presenting to the emergency Department 

with complaints of abdominal discomfort.  Onset of symptoms was yesterday 

morning.  Symptoms worsened throughout the day.  Symptoms are fairly steady 

today.  Discomfort is or 5/10.  Discomfort is mostly right lower quadrant.  

Slight decrease in appetite.  Patient is tolerating oral intake however.  No 

fevers.  No nausea vomiting.  patient diarrhea.  Symptoms do worsen with 

position changes.  No history of similar symptoms previously.





- Related Data


                                Home Medications











 Medication  Instructions  Recorded  Confirmed


 


Esomeprazole Magnesium [NexIUM] 40 mg PO DAILY 17


 


oxyCODONE-APAP 10-325MG [Percocet 1 tab PO BID 17





 mg]   


 


Fluticasone Nasal Spray [Flonase 2 spray EA NOSTRIL DAILY PRN 20





Nasal Spray]   











                                    Allergies











Allergy/AdvReac Type Severity Reaction Status Date / Time


 


Iodinated Contrast Media Allergy  Itching/shortness Verified 20 16:02





[Iodinated Contrast- Oral   of breath  





and IV Dye]     














Review of Systems


ROS Statement: 


Those systems with pertinent positive or pertinent negative responses have been 

documented in the HPI.





ROS Other: All systems not noted in ROS Statement are negative.


Constitutional: Denies: fever


Eyes: Denies: eye pain


ENT: Denies: ear pain


Respiratory: Denies: cough


Cardiovascular: Denies: chest pain


Endocrine: Denies: fatigue


Gastrointestinal: Reports: as per HPI, abdominal pain.  Denies: nausea, 

vomiting, diarrhea, constipation


Genitourinary: Denies: dysuria


Musculoskeletal: Denies: back pain


Skin: Denies: rash


Neurological: Denies: weakness





Past Medical History


Past Medical History: No Reported History


Additional Past Medical History / Comment(s): chronic back pain- pt recieving 

infusions and rule out MS.


History of Any Multi-Drug Resistant Organisms: C-DIFF


Date of last positivie culture/infection: 


MDRO Source:: stool


Past Surgical History: Breast Surgery, Tonsillectomy


Additional Past Surgical History / Comment(s): lumpectomy


Past Anesthesia/Blood Transfusion Reactions: No Reported Reaction


Past Psychological History: No Psychological Hx Reported


Smoking Status: Current every day smoker


Past Alcohol Use History: Occasional


Past Drug Use History: None Reported





- Past Family History


  ** Father


Family Medical History: Diabetes Mellitus


Additional Family Medical History / Comment(s): heart disease.  at 22 from 

car accident.





  ** Mother


Family Medical History: No Reported History





General Exam


Limitations: no limitations


General appearance: alert, in no apparent distress


Head exam: Present: normocephalic


Eye exam: Present: normal appearance


Neck exam: Present: normal inspection


Respiratory exam: Present: normal lung sounds bilaterally


Cardiovascular Exam: Present: regular rate, normal rhythm


  ** Expanded


Peripheral pulses: 2+: Dorsalis Pedis (R), Dorsalis Pedis (L)


GI/Abdominal exam: Present: soft, tenderness (Moderate tenderness right lower 

quadrant), normal bowel sounds.  Absent: distended, guarding, rebound, rigid, 

pulsatile mass


Extremities exam: Present: normal inspection


Back exam: Present: normal inspection.  Absent: tenderness


Neurological exam: Present: alert


Psychiatric exam: Present: normal affect, normal mood


Skin exam: Present: normal color





Course


                                   Vital Signs











  20





  16:02


 


Temperature 98.6 F


 


Pulse Rate 89


 


Respiratory 16





Rate 


 


Blood Pressure 124/76


 


O2 Sat by Pulse 98





Oximetry 














Medical Decision Making





- Medical Decision Making





Patient reevaluated and resting comfortably at bedside.  Patient and family are 

updated on results.  Patient states she still has some discomfort however does 

not want further medication and does request discharge home.





- Lab Data


Result diagrams: 


                                 20 17:02





                                 20 17:02


                                   Lab Results











  20 Range/Units





  17:02 17:02 17:02 


 


WBC  10.1    (3.8-10.6)  k/uL


 


RBC  5.01    (3.80-5.40)  m/uL


 


Hgb  14.5    (11.4-16.0)  gm/dL


 


Hct  43.2    (34.0-46.0)  %


 


MCV  86.1    (80.0-100.0)  fL


 


MCH  28.9    (25.0-35.0)  pg


 


MCHC  33.5    (31.0-37.0)  g/dL


 


RDW  13.1    (11.5-15.5)  %


 


Plt Count  300    (150-450)  k/uL


 


Neutrophils %  57    %


 


Lymphocytes %  32    %


 


Monocytes %  5    %


 


Eosinophils %  4    %


 


Basophils %  1    %


 


Neutrophils #  5.7    (1.3-7.7)  k/uL


 


Lymphocytes #  3.2    (1.0-4.8)  k/uL


 


Monocytes #  0.5    (0-1.0)  k/uL


 


Eosinophils #  0.4    (0-0.7)  k/uL


 


Basophils #  0.1    (0-0.2)  k/uL


 


PT   10.1   (9.0-12.0)  sec


 


INR   1.0   (<1.2)  


 


APTT   22.6   (22.0-30.0)  sec


 


Sodium     (137-145)  mmol/L


 


Potassium     (3.5-5.1)  mmol/L


 


Chloride     ()  mmol/L


 


Carbon Dioxide     (22-30)  mmol/L


 


Anion Gap     mmol/L


 


BUN     (7-17)  mg/dL


 


Creatinine     (0.52-1.04)  mg/dL


 


Est GFR (CKD-EPI)AfAm     (>60 ml/min/1.73 sqM)  


 


Est GFR (CKD-EPI)NonAf     (>60 ml/min/1.73 sqM)  


 


Glucose     (74-99)  mg/dL


 


Calcium     (8.4-10.2)  mg/dL


 


Total Bilirubin     (0.2-1.3)  mg/dL


 


AST     (14-36)  U/L


 


ALT     (4-34)  U/L


 


Alkaline Phosphatase     ()  U/L


 


Total Protein     (6.3-8.2)  g/dL


 


Albumin     (3.5-5.0)  g/dL


 


Amylase     ()  U/L


 


Lipase     ()  U/L


 


Urine Color    Yellow  


 


Urine Appearance    Clear  (Clear)  


 


Urine pH    5.5  (5.0-8.0)  


 


Ur Specific Gravity    1.025  (1.001-1.035)  


 


Urine Protein    Negative  (Negative)  


 


Urine Glucose (UA)    Negative  (Negative)  


 


Urine Ketones    Negative  (Negative)  


 


Urine Blood    Trace H  (Negative)  


 


Urine Nitrite    Negative  (Negative)  


 


Urine Bilirubin    Negative  (Negative)  


 


Urine Urobilinogen    2.0  (<2.0)  mg/dL


 


Ur Leukocyte Esterase    Negative  (Negative)  


 


Urine RBC    1  (0-5)  /hpf


 


Urine WBC    <1  (0-5)  /hpf


 


Ur Squamous Epith Cells    1  (0-4)  /hpf


 


Urine Bacteria    Rare H  (None)  /hpf


 


Hyaline Casts    1  (0-2)  /lpf


 


Urine Mucus    Occasional H  (None)  /hpf


 


Urine HCG, Qual     (Not Detectd)  














  20 Range/Units





  17:02 17:02 


 


WBC    (3.8-10.6)  k/uL


 


RBC    (3.80-5.40)  m/uL


 


Hgb    (11.4-16.0)  gm/dL


 


Hct    (34.0-46.0)  %


 


MCV    (80.0-100.0)  fL


 


MCH    (25.0-35.0)  pg


 


MCHC    (31.0-37.0)  g/dL


 


RDW    (11.5-15.5)  %


 


Plt Count    (150-450)  k/uL


 


Neutrophils %    %


 


Lymphocytes %    %


 


Monocytes %    %


 


Eosinophils %    %


 


Basophils %    %


 


Neutrophils #    (1.3-7.7)  k/uL


 


Lymphocytes #    (1.0-4.8)  k/uL


 


Monocytes #    (0-1.0)  k/uL


 


Eosinophils #    (0-0.7)  k/uL


 


Basophils #    (0-0.2)  k/uL


 


PT    (9.0-12.0)  sec


 


INR    (<1.2)  


 


APTT    (22.0-30.0)  sec


 


Sodium   136 L  (137-145)  mmol/L


 


Potassium   3.9  (3.5-5.1)  mmol/L


 


Chloride   106  ()  mmol/L


 


Carbon Dioxide   24  (22-30)  mmol/L


 


Anion Gap   6  mmol/L


 


BUN   9  (7-17)  mg/dL


 


Creatinine   0.63  (0.52-1.04)  mg/dL


 


Est GFR (CKD-EPI)AfAm   >90  (>60 ml/min/1.73 sqM)  


 


Est GFR (CKD-EPI)NonAf   >90  (>60 ml/min/1.73 sqM)  


 


Glucose   107 H  (74-99)  mg/dL


 


Calcium   9.7  (8.4-10.2)  mg/dL


 


Total Bilirubin   0.5  (0.2-1.3)  mg/dL


 


AST   25  (14-36)  U/L


 


ALT   23  (4-34)  U/L


 


Alkaline Phosphatase   61  ()  U/L


 


Total Protein   6.9  (6.3-8.2)  g/dL


 


Albumin   4.3  (3.5-5.0)  g/dL


 


Amylase   55  ()  U/L


 


Lipase   77  ()  U/L


 


Urine Color    


 


Urine Appearance    (Clear)  


 


Urine pH    (5.0-8.0)  


 


Ur Specific Gravity    (1.001-1.035)  


 


Urine Protein    (Negative)  


 


Urine Glucose (UA)    (Negative)  


 


Urine Ketones    (Negative)  


 


Urine Blood    (Negative)  


 


Urine Nitrite    (Negative)  


 


Urine Bilirubin    (Negative)  


 


Urine Urobilinogen    (<2.0)  mg/dL


 


Ur Leukocyte Esterase    (Negative)  


 


Urine RBC    (0-5)  /hpf


 


Urine WBC    (0-5)  /hpf


 


Ur Squamous Epith Cells    (0-4)  /hpf


 


Urine Bacteria    (None)  /hpf


 


Hyaline Casts    (0-2)  /lpf


 


Urine Mucus    (None)  /hpf


 


Urine HCG, Qual  Not Detected   (Not Detectd)  














- Radiology Data


Radiology results: report reviewed (Computed tomography scan of the abdomen 

pelvis shows no acute process)





Disposition


Clinical Impression: 


 Abdominal pain





Disposition: HOME SELF-CARE


Condition: Stable


Instructions (If sedation given, give patient instructions):  Abdominal Pain 

(ED)


Additional Instructions: 


Please follow-up with primary care physician in the next couple of days for 

recheck.  Return for fevers, increased pain, worsening or change in symptoms or 

other concerns.


Is patient prescribed a controlled substance at d/c from ED?: No


Referrals: 


Lester Stallworth DO [Primary Care Provider] - 1-2 days


Time of Disposition: 19:11

## 2020-11-23 ENCOUNTER — HOSPITAL ENCOUNTER (OUTPATIENT)
Dept: HOSPITAL 47 - LABWHC1 | Age: 45
Discharge: HOME | End: 2020-11-23
Attending: PSYCHIATRY & NEUROLOGY
Payer: COMMERCIAL

## 2020-11-23 DIAGNOSIS — Z20.828: Primary | ICD-10-CM

## 2020-12-11 ENCOUNTER — HOSPITAL ENCOUNTER (OUTPATIENT)
Dept: HOSPITAL 47 - RADMRIMAIN | Age: 45
Discharge: HOME | End: 2020-12-11
Attending: NURSE PRACTITIONER
Payer: COMMERCIAL

## 2020-12-11 DIAGNOSIS — M54.6: ICD-10-CM

## 2020-12-11 DIAGNOSIS — M54.2: ICD-10-CM

## 2020-12-11 DIAGNOSIS — M51.27: ICD-10-CM

## 2020-12-11 DIAGNOSIS — Z91.040: ICD-10-CM

## 2020-12-11 DIAGNOSIS — M51.26: Primary | ICD-10-CM

## 2020-12-11 PROCEDURE — 72146 MRI CHEST SPINE W/O DYE: CPT

## 2020-12-11 PROCEDURE — 72141 MRI NECK SPINE W/O DYE: CPT

## 2020-12-11 PROCEDURE — 72148 MRI LUMBAR SPINE W/O DYE: CPT

## 2020-12-11 NOTE — MR
EXAMINATION TYPE: MR cspine/tspine/lspine wo con

 

DATE OF EXAM: 12/11/2020

 

COMPARISON: None

 

HISTORY: Neck/back pain, limited ROM, BUE/BLE radiculopathy

 

CONTRAST:  Performed utilizing 0 mL intravenous Gadavist gadolinium contrast.  

 

TECHNIQUE: Multiplanar multiecho imaging on a 3.0 Colette magnet is performed through the cervical spin
e.

 

FINDINGS: The craniovertebral junction is normal.  Vertebral body alignment is normal.

 

C7-T1:  No focal disc herniation or significant disc bulge is evident.  No spinal canal stenosis or n
eural foraminal stenosis is present.

 

C6-7: Mild disc bulge present. This is slightly greater in the left paracentral region. No cord conta
ct is evident. No spinal canal stenosis is present. Neural foramen are patent..

 

C5-6: No focal disc herniation or significant disc bulge is evident.  No spinal canal stenosis or yessica
ral foraminal stenosis is present.

 

C4-5: Mild central focal bulge is present. No spinal canal stenosis present. Neural foramen are paten
t. No cord flattening is evident.

 

C3-4: No focal disc herniation or significant disc bulge is evident.  No spinal canal stenosis or yessica
ral foraminal stenosis is present.

 

C2-3: No focal disc herniation or significant disc bulge is evident.  No spinal canal stenosis or yessica
ral foraminal stenosis is present.

 

 

IMPRESSIONS:

 

1. Mild central focal protrusion L4-5 with mild anterior thecal sac compression. No stenosis or cord 
contact is evident.

2. Mild broad-based disc bulge C6-7 with anterior thecal sac flattening. This is slightly greater in 
the left paracentral region no cord contact is evident.

 

EXAMINATION TYPE: MR morgan/tspine/lspine wo con

 

DATE OF EXAM: 12/11/2020

 

COMPARISON: None

 

HISTORY: Neck/back pain, limited ROM, BUE/BLE radiculopathy

 

CONTRAST:  Performed utilizing 0 mL intravenous Gadavist gadolinium contrast.  

 

TECHNIQUE: Multiplanar, multiecho imaging on a 3.0 Colette magnet is performed through the thoracic spi
ne. 

 

Spinal cord maintains normal signal through its visualized course.

Vertebral body alignment is normal. 

Vertebral body heights are preserved.

Disc heights are preserved. Mild central focal bulge may be present T6-T7. No cord contact is evident
.

Mid thoracic spine disc desiccation is present T6-7, T7-8, T8-9.

 

No spinal canal stenosis is evident.

 

IMPRESSIONS:

 

1. Mild broad-based disc bulge T6-T7 with mild anterior thecal sac contact. No spinal canal stenosis 
or neural foraminal stenosis is present

 

EXAMINATION TYPE: MR cspine/tspine/lspine wo con

 

DATE OF EXAM: 12/11/2020

 

COMPARISON: None

 

HISTORY: Neck/back pain, limited ROM, BUE/BLE radiculopathy

 

CONTRAST: 0 mL intravenous Gadavist.

 

TECHNIQUE: 

Multiplanar, multisequence images of the lumbar spine were acquired.

 

FINDINGS: Cord terminates at the T12-L1 level.

 

L5-S1: Broad-based central disc bulge is present. Anterior thecal sac contact is present. No spinal c
anal stenosis or nerve root impingement is evident. 

 

L4-L5: Mild broad-based disc bulge has anterior thecal sac and.  No spinal canal stenosis.  No forami
nal stenosis.  Mild facet hypertrophy is present read no significant posterior lateral thecal sac com
pression is evident.

 

L3-L4: No significant disc bulge or disc herniation.  No spinal canal stenosis.  No foraminal stenosi
s.  

 

L2-L3: No significant disc bulge or disc herniation.  No spinal canal stenosis.  No foraminal stenosi
s.  

 

L1-L2: No significant disc bulge or disc herniation.  No spinal canal stenosis.  No foraminal stenosi
s.  

 

T12-L1: No significant disc bulge or disc herniation.  No spinal canal stenosis.  No foraminal stenos
is.  

 

 

 

IMPRESSION:

1. Broad-based disc bulge L5-S1 with anterior thecal sac contact and exiting nerve root contact. No s
tenosis or impingement is evident.

2. Mild broad-based disc bulge at L4-5 has anterior thecal sac flattening.

## 2021-02-13 ENCOUNTER — HOSPITAL ENCOUNTER (EMERGENCY)
Dept: HOSPITAL 47 - EC | Age: 46
Discharge: HOME | End: 2021-02-13
Payer: COMMERCIAL

## 2021-02-13 VITALS
TEMPERATURE: 97.9 F | DIASTOLIC BLOOD PRESSURE: 88 MMHG | SYSTOLIC BLOOD PRESSURE: 117 MMHG | RESPIRATION RATE: 18 BRPM | HEART RATE: 74 BPM

## 2021-02-13 DIAGNOSIS — M25.512: ICD-10-CM

## 2021-02-13 DIAGNOSIS — K21.9: ICD-10-CM

## 2021-02-13 DIAGNOSIS — R07.9: Primary | ICD-10-CM

## 2021-02-13 DIAGNOSIS — Z90.89: ICD-10-CM

## 2021-02-13 DIAGNOSIS — Z79.899: ICD-10-CM

## 2021-02-13 DIAGNOSIS — Z91.041: ICD-10-CM

## 2021-02-13 DIAGNOSIS — F17.200: ICD-10-CM

## 2021-02-13 LAB
ALBUMIN SERPL-MCNC: 3.9 G/DL (ref 3.5–5)
ALP SERPL-CCNC: 51 U/L (ref 38–126)
ALT SERPL-CCNC: 34 U/L (ref 4–34)
ANION GAP SERPL CALC-SCNC: 8 MMOL/L
APTT BLD: 23.9 SEC (ref 22–30)
AST SERPL-CCNC: 31 U/L (ref 14–36)
BASOPHILS # BLD AUTO: 0.1 K/UL (ref 0–0.2)
BASOPHILS NFR BLD AUTO: 1 %
BUN SERPL-SCNC: 14 MG/DL (ref 7–17)
CALCIUM SPEC-MCNC: 9.1 MG/DL (ref 8.4–10.2)
CHLORIDE SERPL-SCNC: 104 MMOL/L (ref 98–107)
CO2 SERPL-SCNC: 23 MMOL/L (ref 22–30)
D DIMER PPP FEU-MCNC: 0.33 MG/L FEU (ref ?–0.6)
EOSINOPHIL # BLD AUTO: 0.5 K/UL (ref 0–0.7)
EOSINOPHIL NFR BLD AUTO: 5 %
ERYTHROCYTE [DISTWIDTH] IN BLOOD BY AUTOMATED COUNT: 5.44 M/UL (ref 3.8–5.4)
ERYTHROCYTE [DISTWIDTH] IN BLOOD: 12.6 % (ref 11.5–15.5)
GLUCOSE SERPL-MCNC: 110 MG/DL (ref 74–99)
HCT VFR BLD AUTO: 47.1 % (ref 34–46)
HGB BLD-MCNC: 16.3 GM/DL (ref 11.4–16)
INR PPP: 0.9 (ref ?–1.2)
LYMPHOCYTES # SPEC AUTO: 3 K/UL (ref 1–4.8)
LYMPHOCYTES NFR SPEC AUTO: 28 %
MAGNESIUM SPEC-SCNC: 2.1 MG/DL (ref 1.6–2.3)
MCH RBC QN AUTO: 29.9 PG (ref 25–35)
MCHC RBC AUTO-ENTMCNC: 34.5 G/DL (ref 31–37)
MCV RBC AUTO: 86.6 FL (ref 80–100)
MONOCYTES # BLD AUTO: 0.6 K/UL (ref 0–1)
MONOCYTES NFR BLD AUTO: 6 %
NEUTROPHILS # BLD AUTO: 6.4 K/UL (ref 1.3–7.7)
NEUTROPHILS NFR BLD AUTO: 60 %
PLATELET # BLD AUTO: 285 K/UL (ref 150–450)
POTASSIUM SERPL-SCNC: 3.7 MMOL/L (ref 3.5–5.1)
PROT SERPL-MCNC: 6.9 G/DL (ref 6.3–8.2)
PT BLD: 10.1 SEC (ref 9–12)
SODIUM SERPL-SCNC: 135 MMOL/L (ref 137–145)
WBC # BLD AUTO: 10.8 K/UL (ref 3.8–10.6)

## 2021-02-13 PROCEDURE — 99285 EMERGENCY DEPT VISIT HI MDM: CPT

## 2021-02-13 PROCEDURE — 96375 TX/PRO/DX INJ NEW DRUG ADDON: CPT

## 2021-02-13 PROCEDURE — 36415 COLL VENOUS BLD VENIPUNCTURE: CPT

## 2021-02-13 PROCEDURE — 80053 COMPREHEN METABOLIC PANEL: CPT

## 2021-02-13 PROCEDURE — 85025 COMPLETE CBC W/AUTO DIFF WBC: CPT

## 2021-02-13 PROCEDURE — 83880 ASSAY OF NATRIURETIC PEPTIDE: CPT

## 2021-02-13 PROCEDURE — 93005 ELECTROCARDIOGRAM TRACING: CPT

## 2021-02-13 PROCEDURE — 85730 THROMBOPLASTIN TIME PARTIAL: CPT

## 2021-02-13 PROCEDURE — 85379 FIBRIN DEGRADATION QUANT: CPT

## 2021-02-13 PROCEDURE — 71275 CT ANGIOGRAPHY CHEST: CPT

## 2021-02-13 PROCEDURE — 84484 ASSAY OF TROPONIN QUANT: CPT

## 2021-02-13 PROCEDURE — 85610 PROTHROMBIN TIME: CPT

## 2021-02-13 PROCEDURE — 83735 ASSAY OF MAGNESIUM: CPT

## 2021-02-13 PROCEDURE — 96374 THER/PROPH/DIAG INJ IV PUSH: CPT

## 2021-02-13 NOTE — CT
EXAMINATION TYPE: CT chest angio for PE

 

DATE OF EXAM: 2/13/2021

 

COMPARISON: 11/12/2017.

 

HISTORY: chest pain

 

CT DLP: 328.8 mGycm

Automated exposure control for dose reduction was used.

 

CONTRAST: 

CT Chest for pulmonary embolism performed with with IV Contrast, patient injected with 80cc mL of Iso
jarrod 370. Coronal, sagittal and MIPS reformats were generated and reviewed.

 

FINDINGS:

 

LUNGS: The lungs are grossly clear, there is no concerning parenchymal mass or nodule identified.   T
here is no pleural effusion or pneumothorax seen.  The tracheobronchial tree is patent. There is mild
 biapical blebs.

 

MEDIASTINUM: There is satisfactory enhancement of the pulmonary artery and its branches, there is no 
CT evidence for pulmonary embolism.  There are no greater than 1 cm hilar or mediastinal lymph nodes.
   No pericardial effusion is seen.  

 

OTHER:  No additional significant abnormality is seen.

 

IMPRESSION:

No acute PE or other cardiopulmonary abnormality.

## 2021-02-13 NOTE — ED
Chest Pain HPI





- General


Chief Complaint: Chest Pain


Stated Complaint: Sent by [employer] - Shoulder Pain


Source: patient


Mode of arrival: ambulatory


Limitations: no limitations





- History of Present Illness


Initial Comments: 





45-year-old female with past history of chronic back pain who presents to the 

emergency room with reported chest pain.  States that for the past 2 days she 

has had left-sided chest pain which radiates into her left shoulder.  Denies 

previous history of cardiac disease.  No history of DVT or PE.  Pain is made 

worse with inspiration.  Has not taken any medications for her symptoms.  

Followed up with [employer] today who referred her to the emergency department 

to rule out a PE.  Patient denies any lower extremity swelling.  She is a 

smoker.  No recent travel.  No birth control use.  No family history of clotting

disorders.  Denies fevers, chills or cough.  No nausea or vomiting.  No ripping 

or tearing physician or back.  No unilateral numbness or weakness.  No 

alleviating, precipitating or modifying factors





- Related Data


                                Home Medications











 Medication  Instructions  Recorded  Confirmed


 


Esomeprazole Magnesium [NexIUM] 40 mg PO DAILY 17


 


oxyCODONE-APAP 10-325MG [Percocet 1 tab PO BID PRN 17





 mg]   











                                    Allergies











Allergy/AdvReac Type Severity Reaction Status Date / Time


 


Iodinated Contrast Media Allergy  Itching/shortness Verified 21 17:57





[Iodinated Contrast- Oral   of breath  





and IV Dye]     














Review of Systems


ROS Statement: 


Those systems with pertinent positive or pertinent negative responses have been 

documented in the HPI.





ROS Other: All systems not noted in ROS Statement are negative.





EKG Findings





- EKG Comments:


EKG Findings:: EKG demonstrates a sinus rhythm with a ventricular rate of 85.  

NV interval 144.  QRS 84.  QTC of 416.  No acute ST segment elevations or 

depressions.  J-point elevation in the inferior leads. No reciprocal changes





Past Medical History


Past Medical History: GERD/Reflux


Additional Past Medical History / Comment(s): chronic back pain- pt recieving 

infusions and rule out MS.


History of Any Multi-Drug Resistant Organisms: C-DIFF


Date of last positivie culture/infection: 


MDRO Source:: stool


Past Surgical History: Breast Surgery, Tonsillectomy


Additional Past Surgical History / Comment(s): lumpectomy


Past Anesthesia/Blood Transfusion Reactions: No Reported Reaction


Past Psychological History: No Psychological Hx Reported


Smoking Status: Current every day smoker


Past Alcohol Use History: Occasional


Past Drug Use History: None Reported





- Past Family History


  ** Father


Family Medical History: Diabetes Mellitus


Additional Family Medical History / Comment(s): heart disease.  at 22 from 

car accident.





  ** Mother


Family Medical History: No Reported History





General Exam


Limitations: no limitations





Course


                                   Vital Signs











  21





  16:26 18:50


 


Temperature 98.4 F 97.9 F


 


Pulse Rate 97 74


 


Respiratory 20 18





Rate  


 


Blood Pressure 122/81 117/88


 


O2 Sat by Pulse 98 98





Oximetry  














Chest Pain MDM





- MDM





Upon arrival patient is placed into room 8.  A thorough history and physical 

exam was performed.  IV is established.  Laboratories is a conducted.  Patient 

is pretreated for her iodine ALLERGY.  Laboratory studies are reviewed by 

myself.  Troponin is negative.  CT is performed which does demonstrate some 

apical blebs.  No pulmonary embolism.  Results are discussed the patient.  Did 

recommend hospitalization for chest pain for which the patient refused.  Risks 

are discussed the patient.  She is alert, oriented and capable of making his own

decisions on her own.  Patient will be discharged home to follow up with primary

care doctor in 2-4 days.  Did recommend a full cardiac workup which the patient 

understood and agreed to.  Return to the emergency room for any new or worsening

symptoms.  Patient was discharged home in stable condition





Disposition


Clinical Impression: 


 Chest pain





Disposition: HOME SELF-CARE


Condition: Stable


Instructions (If sedation given, give patient instructions):  Chest Pain (ED)


Additional Instructions: 


You need to follow up with your primary care doctor within 2-4 days.  I 

recommend echo and Holter monitoring.  Return to the emergency room for any new 

or worsening symptoms


Is patient prescribed a controlled substance at d/c from ED?: No


Referrals: 


Lester Stallworth DO [Primary Care Provider] - 1-2 days


Time of Disposition: 19:29

## 2021-06-21 ENCOUNTER — HOSPITAL ENCOUNTER (OUTPATIENT)
Dept: HOSPITAL 47 - LABWHC1 | Age: 46
End: 2021-06-21
Attending: NURSE PRACTITIONER
Payer: COMMERCIAL

## 2021-06-21 ENCOUNTER — HOSPITAL ENCOUNTER (OUTPATIENT)
Dept: HOSPITAL 47 - RADMAMWWP | Age: 46
Discharge: HOME | End: 2021-06-21
Attending: OBSTETRICS & GYNECOLOGY
Payer: COMMERCIAL

## 2021-06-21 DIAGNOSIS — R53.83: ICD-10-CM

## 2021-06-21 DIAGNOSIS — M79.10: ICD-10-CM

## 2021-06-21 DIAGNOSIS — E53.9: ICD-10-CM

## 2021-06-21 DIAGNOSIS — Z80.3: ICD-10-CM

## 2021-06-21 DIAGNOSIS — Z12.31: Primary | ICD-10-CM

## 2021-06-21 DIAGNOSIS — E55.9: Primary | ICD-10-CM

## 2021-06-21 LAB
ALBUMIN SERPL-MCNC: 4.5 G/DL (ref 3.8–4.9)
ALBUMIN/GLOB SERPL: 1.96 G/DL (ref 1.6–3.17)
ALP SERPL-CCNC: 69 U/L (ref 41–126)
ALT SERPL-CCNC: 24 U/L (ref 8–44)
AMYLASE SERPL-CCNC: 53 U/L (ref 23–121)
ANION GAP SERPL CALC-SCNC: 6.2 MMOL/L (ref 4–12)
AST SERPL-CCNC: 18 U/L (ref 13–35)
BASOPHILS # BLD AUTO: 0.06 X 10*3/UL (ref 0–0.1)
BASOPHILS NFR BLD AUTO: 0.8 %
BUN SERPL-SCNC: 12 MG/DL (ref 9–27)
BUN/CREAT SERPL: 13.33 RATIO (ref 12–20)
CALCIUM SPEC-MCNC: 9.4 MG/DL (ref 8.7–10.3)
CHLORIDE SERPL-SCNC: 105 MMOL/L (ref 96–109)
CO2 SERPL-SCNC: 26.8 MMOL/L (ref 21.6–31.8)
EOSINOPHIL # BLD AUTO: 0.26 X 10*3/UL (ref 0.04–0.35)
EOSINOPHIL NFR BLD AUTO: 3.6 %
ERYTHROCYTE [DISTWIDTH] IN BLOOD BY AUTOMATED COUNT: 4.87 X 10*6/UL (ref 4.1–5.2)
ERYTHROCYTE [DISTWIDTH] IN BLOOD: 12.7 % (ref 11.5–14.5)
GLOBULIN SER CALC-MCNC: 2.3 G/DL (ref 1.6–3.3)
GLUCOSE SERPL-MCNC: 105 MG/DL (ref 70–110)
HCT VFR BLD AUTO: 43.2 % (ref 37.2–46.3)
HGB BLD-MCNC: 14.4 G/DL (ref 12–15)
IRON SERPL-MCNC: 98 UG/DL (ref 50–170)
LYMPHOCYTES # SPEC AUTO: 2.5 X 10*3/UL (ref 0.9–5)
LYMPHOCYTES NFR SPEC AUTO: 34.6 %
MCH RBC QN AUTO: 29.6 PG (ref 27–32)
MCHC RBC AUTO-ENTMCNC: 33.3 G/DL (ref 32–37)
MCV RBC AUTO: 88.7 FL (ref 80–97)
MONOCYTES # BLD AUTO: 0.6 X 10*3/UL (ref 0.2–1)
MONOCYTES NFR BLD AUTO: 8.3 %
NEUTROPHILS # BLD AUTO: 3.76 X 10*3/UL (ref 1.8–7.7)
NEUTROPHILS NFR BLD AUTO: 52.1 %
PLATELET # BLD AUTO: 281 X 10*3/UL (ref 140–440)
POTASSIUM SERPL-SCNC: 4.1 MMOL/L (ref 3.5–5.5)
PROT SERPL-MCNC: 6.8 G/DL (ref 6.2–8.2)
SODIUM SERPL-SCNC: 138 MMOL/L (ref 135–145)
T4 FREE SERPL-MCNC: 1 NG/DL (ref 0.8–1.8)
TIBC SERPL-MCNC: 332 UG/DL (ref 228–460)
VIT B12 SERPL-MCNC: 630 PG/ML (ref 200–944)
WBC # BLD AUTO: 7.22 X 10*3/UL (ref 4.5–10)

## 2021-06-21 PROCEDURE — 77067 SCR MAMMO BI INCL CAD: CPT

## 2021-06-21 PROCEDURE — 77063 BREAST TOMOSYNTHESIS BI: CPT

## 2021-06-21 PROCEDURE — 84481 FREE ASSAY (FT-3): CPT

## 2021-06-21 PROCEDURE — 86140 C-REACTIVE PROTEIN: CPT

## 2021-06-21 PROCEDURE — 82607 VITAMIN B-12: CPT

## 2021-06-21 PROCEDURE — 85025 COMPLETE CBC W/AUTO DIFF WBC: CPT

## 2021-06-21 PROCEDURE — 80053 COMPREHEN METABOLIC PANEL: CPT

## 2021-06-21 PROCEDURE — 36415 COLL VENOUS BLD VENIPUNCTURE: CPT

## 2021-06-21 PROCEDURE — 82150 ASSAY OF AMYLASE: CPT

## 2021-06-21 PROCEDURE — 84443 ASSAY THYROID STIM HORMONE: CPT

## 2021-06-21 PROCEDURE — 84439 ASSAY OF FREE THYROXINE: CPT

## 2021-06-21 PROCEDURE — 84207 ASSAY OF VITAMIN B-6: CPT

## 2021-06-21 PROCEDURE — 82306 VITAMIN D 25 HYDROXY: CPT

## 2021-06-21 PROCEDURE — 83550 IRON BINDING TEST: CPT

## 2021-06-21 PROCEDURE — 86038 ANTINUCLEAR ANTIBODIES: CPT

## 2021-06-21 PROCEDURE — 83540 ASSAY OF IRON: CPT

## 2021-06-21 PROCEDURE — 85652 RBC SED RATE AUTOMATED: CPT

## 2021-06-22 NOTE — MM
Reason for exam: screening  (asymptomatic).

Last mammogram was performed 1 year ago.



History:

Family history of breast cancer in maternal aunt and breast cancer in paternal 

grandmother.

Benign excisional biopsy of the right breast, 2016.

Took hormonal contraceptives for 12 years beginning at age 18.



Physical Findings:

A clinical breast exam by your physician is recommended on an annual basis and 

results should be correlated with mammographic findings.



MG 3D Screening Mammo W/Cad

Bilateral CC and MLO view(s) were taken.

Prior study comparison: June 18, 2020, bilateral MG 3d work up w/cad [facility].  May 28,

2020, bilateral MG 3d screening mammo w/cad.

The breast tissue is heterogeneously dense. This may lower the sensitivity of 

mammography.





ASSESSMENT: Negative, BI-RAD 1



RECOMMENDATION:

Routine screening mammogram of both breasts in 1 year.

## 2021-11-29 ENCOUNTER — HOSPITAL ENCOUNTER (EMERGENCY)
Dept: HOSPITAL 47 - EC | Age: 46
Discharge: HOME | End: 2021-11-29
Payer: COMMERCIAL

## 2021-11-29 VITALS
RESPIRATION RATE: 16 BRPM | SYSTOLIC BLOOD PRESSURE: 117 MMHG | DIASTOLIC BLOOD PRESSURE: 64 MMHG | TEMPERATURE: 98 F | HEART RATE: 77 BPM

## 2021-11-29 DIAGNOSIS — F17.200: ICD-10-CM

## 2021-11-29 DIAGNOSIS — K21.9: ICD-10-CM

## 2021-11-29 DIAGNOSIS — U07.1: Primary | ICD-10-CM

## 2021-11-29 DIAGNOSIS — Z79.899: ICD-10-CM

## 2021-11-29 PROCEDURE — 99283 EMERGENCY DEPT VISIT LOW MDM: CPT

## 2021-11-29 PROCEDURE — 87635 SARS-COV-2 COVID-19 AMP PRB: CPT

## 2022-06-27 ENCOUNTER — HOSPITAL ENCOUNTER (OUTPATIENT)
Dept: HOSPITAL 47 - RADMAMWWP | Age: 47
Discharge: HOME | End: 2022-06-27
Attending: OBSTETRICS & GYNECOLOGY
Payer: COMMERCIAL

## 2022-06-27 DIAGNOSIS — Z12.31: Primary | ICD-10-CM

## 2022-06-27 PROCEDURE — 77063 BREAST TOMOSYNTHESIS BI: CPT

## 2022-06-27 PROCEDURE — 77067 SCR MAMMO BI INCL CAD: CPT

## 2022-06-28 NOTE — MM
Reason for Exam: Screening  (asymptomatic). 

Last screening mammogram was performed 12 month(s) ago.





Patient History: 

Menarche at age 14. First Full-Term Pregnancy at age 24. Hormonal Contraceptives, starting at age 18

for 12 years. 2016, Benign Excisional Biopsy on the right side.

Paternal grandmother had breast cancer at or over age 50. Maternal aunt had breast cancer. Maternal

grandmother had breast cancer at or over age 50.

Last menstrual period: 01/01/2019





Risk Values: 

Tami 5 year model risk: 1.1%.

NCI Lifetime model risk: 10.5%.





Prior Study Comparison: 

5/28/2020 Bilateral Screening Mammogram, [facility]. 6/18/2020 Bilateral Diagnostic Mammogram, [facility].

6/21/2021 Bilateral Screening Mammogram, [facility]. 





Tissue Density: 

The breast tissue is heterogeneously dense. This may lower the sensitivity of mammography.





Findings: 

Analyzed By CAD. 

Areas of bilateral asymmetric densities remain unchanged. No significant change from prior exams.

Suspect chronic excisional scar laterally on the right. 





Overall Assessment: Benign, BI-RAD 2





Management: 

Screening Mammogram of both breasts in 1 year.

1. Patient should continue monthly self breast exams.

2. A clinical breast exam by your physician is recommended on an annual basis.

3. This exam should not preclude additional follow-up of suspicious palpable abnormalities.



Electronically signed and approved by: Vale James M.D. Radiologist

## 2023-04-14 ENCOUNTER — HOSPITAL ENCOUNTER (OUTPATIENT)
Dept: HOSPITAL 47 - ORWHC2ENDO | Age: 48
Discharge: HOME | End: 2023-04-14
Attending: INTERNAL MEDICINE
Payer: COMMERCIAL

## 2023-04-14 VITALS — SYSTOLIC BLOOD PRESSURE: 128 MMHG | HEART RATE: 78 BPM | DIASTOLIC BLOOD PRESSURE: 86 MMHG

## 2023-04-14 VITALS — TEMPERATURE: 97 F | RESPIRATION RATE: 16 BRPM

## 2023-04-14 DIAGNOSIS — K44.9: ICD-10-CM

## 2023-04-14 DIAGNOSIS — F17.210: ICD-10-CM

## 2023-04-14 DIAGNOSIS — K64.8: ICD-10-CM

## 2023-04-14 DIAGNOSIS — K21.00: ICD-10-CM

## 2023-04-14 DIAGNOSIS — Z88.0: ICD-10-CM

## 2023-04-14 DIAGNOSIS — Z88.8: ICD-10-CM

## 2023-04-14 DIAGNOSIS — F41.9: ICD-10-CM

## 2023-04-14 DIAGNOSIS — K29.50: Primary | ICD-10-CM

## 2023-04-14 PROCEDURE — 45378 DIAGNOSTIC COLONOSCOPY: CPT

## 2023-04-14 PROCEDURE — 81025 URINE PREGNANCY TEST: CPT

## 2023-04-14 PROCEDURE — 88305 TISSUE EXAM BY PATHOLOGIST: CPT

## 2023-04-14 PROCEDURE — 43239 EGD BIOPSY SINGLE/MULTIPLE: CPT

## 2023-04-14 NOTE — P.PCN
Date of Procedure: 04/14/23


Procedure(s) Performed: 


Brief history:


Patient is a pleasant 47-year-old white female scheduled for an elective upper 

endoscopy as well as colonoscopy as a part of evaluation of long-standing 

history of GERD/intermittent dysphagia to solids and rectal bleeding.





Procedure performed:


Esophagogastroduodenoscopy with biopsy


Colonoscopy





Preoperative diagnosis:


Long-standing history of GERD/intermittent dysphagia to solids


Intermittent rectal bleeding





Anesthesia: MAC





Procedure:


After informed consent was obtained from the patient  was brought into the 

endoscopy unit and IV  sedation was administered by anesthesia under continuous 

monitoring.  Initially upper endoscopy was done.  The Olympus  video 

endoscope was inserted inserted into the mouth and esophagus intubated without 

any difficulty and was gradually advanced into the stomach and duodenum and 

carefully examined.  The bulb and second part of the duodenum appeared normal.  

The scope was then withdrawn into the stomach adequately insufflated with air 

and upon careful examination  the antrum mild gastritis and biopsies were done 

from this area.  Mucosa of the body, cardia and fundus appeared normal.  The 

scope was then withdrawn into the esophagus.  The GE junction was located at 40 

cm to the incisors.  Small hiatal hernia noted.  It appeared regular with no 

erythema erosions or ulcerations.  Rest of the esophagus appeared normal.  No 

evidence of esophageal stricture.  Biopsies were done from the distal esophagus.

 Patient tolerated the procedure well.





At this time the patient continued to remain sedation.  Initial digital rectal 

examination was normal.  Olympus  video colonoscope was then inserted into

the rectum and gradually advanced to the cecum without any difficulty.  Careful 

examination was performed as the scope was gradually being withdrawn.  The prep 

was excellent.  The cecum, ascending colon, transverse colon, descending colon, 

sigmoid colon and rectum appeared normal.  Retroflexion was performed in the 

rectum and small internal hemorrhoids.  were noted.  Patient tolerated the 

procedure well.





Impression:


1.  Upper endoscopy revealed mild antral gastritis and small hiatal hernia but 

no evidence of esophagitis or esophageal stricture


2.  Colonoscopy revealed small internal hemorrhoids but no evidence of 

colorectal neoplasia








Recommendations:


Findings of this examination were discussed with the patient as well as her 

family.  She was advised to follow with the biopsy results.  Continue with 

Nexium 40 mg daily but change to have our before dinnertime and follow 

antireflux measures.  Recommend a repeat screening colonoscopy in 10 years.

## 2023-07-10 ENCOUNTER — HOSPITAL ENCOUNTER (OUTPATIENT)
Dept: HOSPITAL 47 - RADMAMWWP | Age: 48
Discharge: HOME | End: 2023-07-10
Attending: OBSTETRICS & GYNECOLOGY
Payer: COMMERCIAL

## 2023-07-10 DIAGNOSIS — Z12.31: Primary | ICD-10-CM

## 2023-07-10 DIAGNOSIS — Z80.3: ICD-10-CM

## 2023-07-10 PROCEDURE — 77067 SCR MAMMO BI INCL CAD: CPT

## 2023-07-10 PROCEDURE — 77063 BREAST TOMOSYNTHESIS BI: CPT

## 2023-07-11 NOTE — MM
Reason for Exam: Screening  (asymptomatic). 

Last mammogram was performed 1 year(s) and 1 month(s) ago. 





Patient History: 

Menarche at age 14. First Full-Term Pregnancy at age 24. Hormonal Contraceptives, starting at age 18

for 12 years. 2016, Benign Excisional Biopsy on the right side.

Paternal grandmother had breast cancer at or over age 50. Maternal aunt had breast cancer. Maternal

grandmother had breast cancer at or over age 50. 





Risk Values: 

Tami 5 year model risk: 1.2%.

NCI Lifetime model risk: 10.4%.





Prior Study Comparison: 

6/18/2020 Bilateral Diagnostic Mammogram, [facility]. 6/21/2021 Bilateral Screening Mammogram, [facility].

6/27/2022 Bilateral MG 3D screening mammo w/cad, [facility]. 





Tissue Density: 

The breast tissue is heterogeneously dense. This may lower the sensitivity of mammography.





Findings: 

Analyzed By CAD. 

Bilateral areas of asymmetric density remain unchanged when comparing against the various prior

studies.

There is no suspicious group of microcalcifications or new suspicious mass in either breast. 





Overall Assessment: Benign, BI-RAD 2





Management: 

Screening Mammogram of both breasts in 1 year.

.



Patient should continue monthly self-breast exams.  A clinical breast exam by your physician is

recommended on an annual basis.

This exam should not preclude additional follow-up of suspicious palpable abnormalities.



Note on Tami scores and lifetime risk:

1. A Tami score greater than 3% is considered moderate risk. If this is the case, consider

specialist referral to assess eligibility for a risk reducing agent.

2. If overall lifetime risk for the development of breast cancer is 20% or higher, the patient may

qualify for future screening with alternating mammogram and breast MRI.



Electronically signed and approved by: Vale James M.D. Radiologist

## 2024-12-02 ENCOUNTER — HOSPITAL ENCOUNTER (OUTPATIENT)
Dept: HOSPITAL 47 - RADMAMWWP | Age: 49
Discharge: HOME | End: 2024-12-02
Attending: FAMILY MEDICINE
Payer: COMMERCIAL

## 2024-12-02 DIAGNOSIS — R92.333: ICD-10-CM

## 2024-12-02 DIAGNOSIS — Z78.0: ICD-10-CM

## 2024-12-02 DIAGNOSIS — Z12.31: Primary | ICD-10-CM

## 2024-12-02 DIAGNOSIS — Z80.3: ICD-10-CM

## 2024-12-02 PROCEDURE — 77063 BREAST TOMOSYNTHESIS BI: CPT

## 2024-12-02 PROCEDURE — 77067 SCR MAMMO BI INCL CAD: CPT

## 2024-12-02 NOTE — MM
Reason for Exam: Screening  (asymptomatic). 

Last mammogram was performed 1 year(s) and 5 month(s) ago. 





Patient History: 

Menarche at age 14. First Full-Term Pregnancy at age 24. Postmenopausal. Hormonal Contraceptives,

starting at age 18 for 12 years. 2016, Benign Excisional Biopsy on the right side.

Paternal grandmother had breast cancer at or over age 50. Maternal aunt had breast cancer. Maternal

grandmother had breast cancer at or over age 50. 





Risk Values: 

Tami 5 year model risk: 1.1%.

NCI Lifetime model risk: 10.0%.





Prior Study Comparison: 

4/3/2014 Bilateral Screening Mammogram, [facility]. 5/28/2020 Bilateral Screening Mammogram, [facility]. 6/21/2021

Bilateral Screening Mammogram, [facility]. 6/27/2022 Bilateral MG 3D screening mammo w/cad, [facility]. 7/10/2023

Bilateral MG 3D screening mammo w/cad, [facility]. 





Tissue Density: 

The breasts are heterogeneously dense, which may obscure small masses.





Findings: 

Analyzed By CAD. 

Right breast: Nothing corresponds with area of palpable abnormality in the right breast medial

aspect on CC view. There is no suspicious group of microcalcifications or new suspicious mass.



Left breast: There is no suspicious group of microcalcifications or new suspicious mass. 





Overall Assessment: Incomplete: need additional imaging evaluation, BI-RAD 0





Management: 

Diagnostic Breast Ultrasound of the right breast.

[facility] will attempt to contact patient to return for supplemental views and

ultrasound if indicated.



Patient should continue monthly self-breast exams.  A clinical breast exam by your physician is

recommended on an annual basis.

This exam should not preclude additional follow-up of suspicious palpable abnormalities.



Note on Tami scores and lifetime risk:

1. A Tami score greater than 3% is considered moderate risk. If this is the case, consider

specialist referral to assess eligibility for a risk reducing agent.

2. If overall lifetime risk for the development of breast cancer is 20% or higher, the patient may

qualify for future screening with alternating mammogram and breast MRI.



[facility], Workstation: [id number], 12/2/2024 8:43 AM.



Electronically signed and approved by: Den Jorge DO

## 2024-12-04 ENCOUNTER — HOSPITAL ENCOUNTER (OUTPATIENT)
Dept: HOSPITAL 47 - RADUSWWP | Age: 49
Discharge: HOME | End: 2024-12-04
Attending: FAMILY MEDICINE
Payer: COMMERCIAL

## 2024-12-04 DIAGNOSIS — Z78.0: ICD-10-CM

## 2024-12-04 DIAGNOSIS — Z80.3: ICD-10-CM

## 2024-12-04 DIAGNOSIS — R92.8: Primary | ICD-10-CM

## 2024-12-04 NOTE — USB
Reason for Exam: Characterization of a palpable mass. 





Patient History: 

Menarche at age 14. First Full-Term Pregnancy at age 24. Postmenopausal. Hormonal Contraceptives,

starting at age 18 for 12 years. 2016, Benign Excisional Biopsy on the right side.

Paternal grandmother had breast cancer at or over age 50. Maternal aunt had breast cancer. Maternal

grandmother had breast cancer at or over age 50. 





Risk Values: 

Tami 5 year model risk: 1.1%.

NCI Lifetime model risk: 10.0%.

Technique: 

Method: Targeted.  





Prior Study Comparison: 

6/27/2022 Bilateral MG 3D screening mammo w/cad, [facility]. 7/10/2023 Bilateral MG 3D screening mammo

w/cad, [facility]. 12/2/2024 Bilateral MG 3D screening mammo w/cad, [facility]. 





Findings: 

The area of palpable concern of the right breast, the axilla of the right breast and the

retroareolar of the right breast were scanned.

Targeted ultrasound medial right breast from 3:00 to 5:00 including scanning of the subareolar

region and axilla. With attention to the 4:00 palpable site, no solid or cystic lesion. There is an

adjacent scar from previous benign excisional biopsy. No axillary adenopathy. 





Overall Assessment: Benign, BI-RAD 2





Management: 

Screening Mammogram of both breasts in 1 year.

Further clinical management of any suspicious palpable area. If any enlarging lump, the patient can

be rescanned.



Results were given to the patient verbally at the time of exam.





[facility], Workstation: [id number], 12/4/2024 2:55 PM.



Electronically signed and approved by: Vale James M.D. Radiologist

## 2025-04-25 ENCOUNTER — HOSPITAL ENCOUNTER (OUTPATIENT)
Dept: HOSPITAL 47 - LABWHC1 | Age: 50
Discharge: HOME | End: 2025-04-25
Attending: PSYCHIATRY & NEUROLOGY
Payer: COMMERCIAL

## 2025-04-25 DIAGNOSIS — S61.239A: Primary | ICD-10-CM
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
## 2025-04-25 DIAGNOSIS — S61.439A: ICD-10-CM

## 2025-04-25 PROCEDURE — 36415 COLL VENOUS BLD VENIPUNCTURE: CPT

## 2025-04-25 PROCEDURE — 86706 HEP B SURFACE ANTIBODY: CPT

## 2025-04-25 PROCEDURE — 86803 HEPATITIS C AB TEST: CPT

## 2025-04-25 PROCEDURE — 87390 HIV-1 AG IA: CPT
